# Patient Record
Sex: MALE | Race: BLACK OR AFRICAN AMERICAN | Employment: OTHER | URBAN - METROPOLITAN AREA
[De-identification: names, ages, dates, MRNs, and addresses within clinical notes are randomized per-mention and may not be internally consistent; named-entity substitution may affect disease eponyms.]

---

## 2017-01-09 RX ORDER — LISINOPRIL 40 MG/1
40 TABLET ORAL DAILY
Qty: 90 TAB | Refills: 2 | Status: SHIPPED | OUTPATIENT
Start: 2017-01-09 | End: 2017-10-05 | Stop reason: SDUPTHER

## 2017-01-09 NOTE — TELEPHONE ENCOUNTER
Requested Prescriptions     Pending Prescriptions Disp Refills    lisinopril (PRINIVIL, ZESTRIL) 40 mg tablet 90 Tab 2     Sig: Take 1 Tab by mouth daily.      Last OV-11/30/16  Next OV-6/14/17  Med refilled-4/7/16

## 2017-04-27 RX ORDER — PRAVASTATIN SODIUM 20 MG/1
20 TABLET ORAL
Qty: 90 TAB | Refills: 3 | Status: SHIPPED | OUTPATIENT
Start: 2017-04-27 | End: 2018-03-31 | Stop reason: SDUPTHER

## 2017-04-27 RX ORDER — AMLODIPINE BESYLATE 2.5 MG/1
2.5 TABLET ORAL DAILY
Qty: 90 TAB | Refills: 3 | Status: SHIPPED | OUTPATIENT
Start: 2017-04-27 | End: 2017-06-14 | Stop reason: SDUPTHER

## 2017-04-27 NOTE — TELEPHONE ENCOUNTER
From: Gregoria Adames  To: Elaine Barthel, MD  Sent: 4/27/2017 7:10 AM EDT  Subject: Medication Renewal Request    Original authorizing provider: Elaine Barthel, MD Suella Morones would like a refill of the following medications:  pravastatin (PRAVACHOL) 20 mg tablet Elaine Barthel, MD]  amLODIPine (NORVASC) 2.5 mg tablet Elaine Barthel, MD]    Preferred pharmacy: [facility] [address] [place] [address] 17.:

## 2017-06-14 ENCOUNTER — OFFICE VISIT (OUTPATIENT)
Dept: INTERNAL MEDICINE CLINIC | Age: 62
End: 2017-06-14

## 2017-06-14 VITALS
DIASTOLIC BLOOD PRESSURE: 88 MMHG | HEART RATE: 76 BPM | HEIGHT: 68 IN | WEIGHT: 219.4 LBS | SYSTOLIC BLOOD PRESSURE: 150 MMHG | OXYGEN SATURATION: 97 % | TEMPERATURE: 97.7 F | BODY MASS INDEX: 33.25 KG/M2

## 2017-06-14 DIAGNOSIS — I10 ESSENTIAL HYPERTENSION: ICD-10-CM

## 2017-06-14 DIAGNOSIS — E78.00 PURE HYPERCHOLESTEROLEMIA: Primary | ICD-10-CM

## 2017-06-14 DIAGNOSIS — R73.9 HYPERGLYCEMIA: ICD-10-CM

## 2017-06-14 LAB — HBA1C MFR BLD HPLC: 5.7 % (ref 4.8–5.6)

## 2017-06-14 RX ORDER — AMLODIPINE BESYLATE 5 MG/1
5 TABLET ORAL DAILY
Qty: 90 TAB | Refills: 3 | Status: SHIPPED | OUTPATIENT
Start: 2017-06-14 | End: 2018-06-05 | Stop reason: SDUPTHER

## 2017-06-14 NOTE — MR AVS SNAPSHOT
Visit Information Date & Time Provider Department Dept. Phone Encounter #  
 6/14/2017  8:30 AM Rich Muñoz, [address] [place] [address],[address] [phone] [id number] Follow-up Instructions Return in about 6 months (around 12/14/2017) for htn hld predaiebtes labs psa. Upcoming Health Maintenance Date Due INFLUENZA AGE 9 TO ADULT 8/1/2017 COLONOSCOPY 1/10/2020 DTaP/Tdap/Td series (2 - Td) 11/19/2025 Allergies as of 6/14/2017  Review Complete On: 6/14/2017 By: Gurpreet Cardenas LPN Severity Noted Reaction Type Reactions Livalo [Pitavastatin]  11/24/2014    Myalgia Pravastatin  11/24/2014    Myalgia Patient not allergic Current Immunizations  Reviewed on 10/8/2015 Name Date Influenza Vaccine 8/30/2016, 10/7/2015, 9/17/2014, 10/17/2013 Tdap 11/19/2015 Zoster Vaccine, Live 1/20/2016 Not reviewed this visit You Were Diagnosed With   
  
 Codes Comments Pure hypercholesterolemia    -  Primary ICD-10-CM: E78.00 ICD-9-CM: 272.0 Essential hypertension     ICD-10-CM: I10 
ICD-9-CM: 401.9 Hyperglycemia     ICD-10-CM: R73.9 ICD-9-CM: 790.29 Vitals BP Pulse Temp Height(growth percentile) Weight(growth percentile) SpO2  
 150/88 76 97.7 °F (36.5 °C) (Oral) 5' 8\" (1.727 m) 219 lb 6.4 oz (99.5 kg) 97% BMI Smoking Status 33.36 kg/m2 Never Smoker Vitals History BMI and BSA Data Body Mass Index Body Surface Area  
 33.36 kg/m 2 2.18 m 2 Preferred Pharmacy Pharmacy Name Phone [address] [facility], [address] [phone] Your Updated Medication List  
  
   
This list is accurate as of: 6/14/17  8:50 AM.  Always use your most recent med list. amLODIPine 5 mg tablet Commonly known as:  Cindy Arnt Take 1 Tab by mouth daily. aspirin 81 mg tablet Take  by mouth daily. cyclobenzaprine 5 mg tablet Commonly known as:  FLEXERIL Take 1 Tab by mouth three (3) times daily as needed for Muscle Spasm(s). lisinopril 40 mg tablet Commonly known as:  Minus Salk Take 1 Tab by mouth daily. pravastatin 20 mg tablet Commonly known as:  PRAVACHOL Take 1 Tab by mouth nightly. Prescriptions Sent to Pharmacy Refills  
 amLODIPine (NORVASC) 5 mg tablet 3 Sig: Take 1 Tab by mouth daily. Class: Normal  
 Pharmacy: [facility] [address] #: [phone] Route: Oral  
  
We Performed the Following AMB POC HEMOGLOBIN A1C [86815 CPT(R)] Follow-up Instructions Return in about 6 months (around 12/14/2017) for htn hld predaiebtes labs psa. Introducing [facility]! Dear Lee Gutierrez: Thank you for requesting a [employer] account. Our records indicate that you already have an active [employer] account. You can access your account anytime at [email or web address] Did you know that you can access your hospital and ER discharge instructions at any time in [employer]? You can also review all of your test results from your hospital stay or ER visit. Additional Information If you have questions, please visit the Frequently Asked Questions section of the [employer] website at [email or web address] Remember, [employer] is NOT to be used for urgent needs. For medical emergencies, dial 911. Now available from your iPhone and Android! Please provide this summary of care documentation to your next provider. Your primary care clinician is listed as Shantel ROPER. If you have any questions after today's visit, please call [phone].

## 2017-06-14 NOTE — PROGRESS NOTES
HISTORY OF PRESENT ILLNESS  Shane Copeland is a 64 y.o. male. HPI     F/u HTN HLD prediabetes  No cp or sob sxs  Walks, yardwork, etc no problems  Home bp readinngs avg 130's/80-s  Took hctz in the past but caused dehydration and low sodium    Patient Active Problem List    Diagnosis Date Noted    Hyperglycemia 07/10/2012    HLD (hyperlipidemia) 07/10/2012    HTN (hypertension) 02/22/2010     Current Outpatient Prescriptions   Medication Sig Dispense Refill    amLODIPine (NORVASC) 5 mg tablet Take 1 Tab by mouth daily. 90 Tab 3    pravastatin (PRAVACHOL) 20 mg tablet Take 1 Tab by mouth nightly. 90 Tab 3    lisinopril (PRINIVIL, ZESTRIL) 40 mg tablet Take 1 Tab by mouth daily. 90 Tab 2    aspirin 81 mg Tab Take  by mouth daily.  cyclobenzaprine (FLEXERIL) 5 mg tablet Take 1 Tab by mouth three (3) times daily as needed for Muscle Spasm(s). 30 Tab 0     Allergies   Allergen Reactions    Livalo [Pitavastatin] Myalgia    Pravastatin Myalgia     Patient not allergic      Lab Results  Component Value Date/Time   Hemoglobin A1c 6.2 11/30/2016 08:54 AM   Hemoglobin A1c 6.0 03/25/2015 08:23 AM   Hemoglobin A1c 6.1 11/20/2014 08:53 AM   Glucose 98 11/30/2016 08:54 AM   LDL, calculated 107 11/30/2016 08:54 AM   Creatinine 1.29 11/30/2016 08:54 AM      Lab Results  Component Value Date/Time   Cholesterol, total 183 11/30/2016 08:54 AM   HDL Cholesterol 56 11/30/2016 08:54 AM   LDL, calculated 107 11/30/2016 08:54 AM   Triglyceride 102 11/30/2016 08:54 AM     Lab Results  Component Value Date/Time   GFR est non-AA 59 11/30/2016 08:54 AM   GFR est AA 69 11/30/2016 08:54 AM   Creatinine 1.29 11/30/2016 08:54 AM   BUN 18 11/30/2016 08:54 AM   Sodium 140 11/30/2016 08:54 AM   Potassium 4.4 11/30/2016 08:54 AM   Chloride 101 11/30/2016 08:54 AM   CO2 23 11/30/2016 08:54 AM        ROS    Physical Exam   Constitutional: He appears well-developed and well-nourished. No distress.    Appears stated age   HENT:   Head: Normocephalic. Cardiovascular: Normal rate, regular rhythm and normal heart sounds. Pulmonary/Chest: Effort normal and breath sounds normal.   Abdominal: Soft. Musculoskeletal: He exhibits no edema. Neurological: He is alert. Psychiatric: He has a normal mood and affect. Nursing note and vitals reviewed. ASSESSMENT and PLAN  Teressa Ríos was seen today for blood sugar problem and hypertension. Diagnoses and all orders for this visit:    Pure hypercholesterolemia   Continue statin tx  Essential hypertension   Elevated-increase norvasc 5 mg every day   bp monitoring  Hyperglycemia  -     AMB POC HEMOGLOBIN A1C 5.7 improved, continue diet and exericise    Other orders  -     amLODIPine (NORVASC) 5 mg tablet; Take 1 Tab by mouth daily. Follow-up Disposition:  Return in about 6 months (around 12/14/2017) for htn hld predaiebtes labs psa.

## 2017-10-05 RX ORDER — LISINOPRIL 40 MG/1
40 TABLET ORAL DAILY
Qty: 90 TAB | Refills: 3 | Status: SHIPPED | OUTPATIENT
Start: 2017-10-05 | End: 2017-12-14 | Stop reason: SDUPTHER

## 2017-10-05 NOTE — TELEPHONE ENCOUNTER
From: Anthony Dorantes  To: Catina Duran MD  Sent: 10/5/2017 7:47 AM EDT  Subject: Medication Renewal Request    Original authorizing provider: MD Anthony Mckinnon would like a refill of the following medications:  lisinopril (PRINIVIL, ZESTRIL) 40 mg tablet Catina Duran MD]    Preferred pharmacy: [address] 17.:

## 2017-12-14 ENCOUNTER — OFFICE VISIT (OUTPATIENT)
Dept: INTERNAL MEDICINE CLINIC | Age: 62
End: 2017-12-14

## 2017-12-14 VITALS
SYSTOLIC BLOOD PRESSURE: 146 MMHG | HEART RATE: 85 BPM | BODY MASS INDEX: 33.19 KG/M2 | WEIGHT: 219 LBS | DIASTOLIC BLOOD PRESSURE: 84 MMHG | TEMPERATURE: 98.2 F | OXYGEN SATURATION: 97 % | HEIGHT: 68 IN

## 2017-12-14 DIAGNOSIS — I10 ESSENTIAL HYPERTENSION: Primary | ICD-10-CM

## 2017-12-14 DIAGNOSIS — E78.00 PURE HYPERCHOLESTEROLEMIA: ICD-10-CM

## 2017-12-14 DIAGNOSIS — R73.9 HYPERGLYCEMIA: ICD-10-CM

## 2017-12-14 DIAGNOSIS — Z12.5 PROSTATE CANCER SCREENING: ICD-10-CM

## 2017-12-14 NOTE — PROGRESS NOTES
HISTORY OF PRESENT ILLNESS  Maia Mcwilliams is a 58 y.o. male. HPI   F/u HTN HLD prediabetes  sbp was up last visit -norvasc was increased to 5 mg qd  No cp or sob sxs  Walks, yardwork, etc no problems  Home bp readinngs avg 130's/80-s  Took hctz in the past but caused dehydration and low sodium    Patient Active Problem List    Diagnosis Date Noted    Hyperglycemia 07/10/2012    HLD (hyperlipidemia) 07/10/2012    HTN (hypertension) 02/22/2010     Current Outpatient Prescriptions   Medication Sig Dispense Refill    lisinopril (PRINIVIL, ZESTRIL) 40 mg tablet TAKE 1 TABLET BY MOUTH DAILY 90 Tab 3    lisinopril (PRINIVIL, ZESTRIL) 40 mg tablet Take 1 Tab by mouth daily. 90 Tab 3    amLODIPine (NORVASC) 5 mg tablet Take 1 Tab by mouth daily. 90 Tab 3    pravastatin (PRAVACHOL) 20 mg tablet Take 1 Tab by mouth nightly. 90 Tab 3    cyclobenzaprine (FLEXERIL) 5 mg tablet Take 1 Tab by mouth three (3) times daily as needed for Muscle Spasm(s). 30 Tab 0    aspirin 81 mg Tab Take  by mouth daily.        Allergies   Allergen Reactions    Livalo [Pitavastatin] Myalgia    Pravastatin Myalgia     Patient not allergic     Social History   Substance Use Topics    Smoking status: Never Smoker    Smokeless tobacco: Never Used    Alcohol use Yes      Comment: occasional      Lab Results  Component Value Date/Time   Hemoglobin A1c 6.2 11/30/2016 08:54 AM   Hemoglobin A1c 6.0 03/25/2015 08:23 AM   Hemoglobin A1c 6.1 11/20/2014 08:53 AM   Glucose 98 11/30/2016 08:54 AM   LDL, calculated 107 11/30/2016 08:54 AM   Creatinine 1.29 11/30/2016 08:54 AM      Lab Results  Component Value Date/Time   Cholesterol, total 183 11/30/2016 08:54 AM   HDL Cholesterol 56 11/30/2016 08:54 AM   LDL, calculated 107 11/30/2016 08:54 AM   Triglyceride 102 11/30/2016 08:54 AM     Lab Results  Component Value Date/Time   GFR est non-AA 59 11/30/2016 08:54 AM   GFR est AA 69 11/30/2016 08:54 AM   Creatinine 1.29 11/30/2016 08:54 AM   BUN 18 11/30/2016 08:54 AM   Sodium 140 11/30/2016 08:54 AM   Potassium 4.4 11/30/2016 08:54 AM   Chloride 101 11/30/2016 08:54 AM   CO2 23 11/30/2016 08:54 AM          ROS    Physical Exam   Constitutional: He appears well-developed and well-nourished. No distress. Appears stated age   HENT:   Head: Normocephalic. Cardiovascular: Normal rate, regular rhythm and normal heart sounds. Exam reveals no gallop and no friction rub. No murmur heard. Pulmonary/Chest: Effort normal and breath sounds normal. No respiratory distress. He has no wheezes. He has no rales. He exhibits no tenderness. Abdominal: Soft. Musculoskeletal: He exhibits no edema. Neurological: He is alert. Psychiatric: He has a normal mood and affect. Nursing note and vitals reviewed. ASSESSMENT and PLAN  Diagnoses and all orders for this visit:    1. Essential hypertension  -     CBC W/O DIFF  -     METABOLIC PANEL, COMPREHENSIVE   Reasonable control   DASH diet handout  2. Pure hypercholesterolemia  -     METABOLIC PANEL, COMPREHENSIVE  -     LIPID PANEL   Continue statin  3. Hyperglycemia  -     HEMOGLOBIN A1C WITH EAG   Low calorie diet recommended  4.  Prostate cancer screening  -     PSA SCREENING (SCREENING) ()    rtc 6 months  Follow-up Disposition: Not on File

## 2017-12-14 NOTE — MR AVS SNAPSHOT
Visit Information Date & Time Provider Department Dept. Phone Encounter #  
 12/14/2017  8:30 AM Kyle Berg, [address] [place] [address],[address] [phone] [id number] Follow-up Instructions Return in about 6 months (around 6/14/2018) for htn hld prediabetes. Upcoming Health Maintenance Date Due COLONOSCOPY 1/10/2020 DTaP/Tdap/Td series (2 - Td) 11/19/2025 Allergies as of 12/14/2017  Review Complete On: 12/14/2017 By: Rossy Mandujano LPN Severity Noted Reaction Type Reactions Livalo [Pitavastatin]  11/24/2014    Myalgia Pravastatin  11/24/2014    Myalgia Patient not allergic Current Immunizations  Reviewed on 10/8/2015 Name Date Influenza Vaccine 10/6/2017, 8/30/2016, 10/7/2015, 9/17/2014, 10/17/2013 Tdap 11/19/2015 Zoster Vaccine, Live 1/20/2016 Not reviewed this visit You Were Diagnosed With   
  
 Codes Comments Essential hypertension    -  Primary ICD-10-CM: I10 
ICD-9-CM: 401.9 Pure hypercholesterolemia     ICD-10-CM: E78.00 ICD-9-CM: 272.0 Hyperglycemia     ICD-10-CM: R73.9 ICD-9-CM: 790.29 Prostate cancer screening     ICD-10-CM: Z12.5 ICD-9-CM: V76.44 Vitals BP Pulse Temp Height(growth percentile) Weight(growth percentile) SpO2  
 146/84 85 98.2 °F (36.8 °C) (Oral) 5' 8\" (1.727 m) 219 lb (99.3 kg) 97% BMI Smoking Status 33.3 kg/m2 Never Smoker Vitals History BMI and BSA Data Body Mass Index Body Surface Area  
 33.3 kg/m 2 2.18 m 2 Preferred Pharmacy Pharmacy Name Phone [facility], [address] [place] [phone] Your Updated Medication List  
  
   
This list is accurate as of: 12/14/17  8:41 AM.  Always use your most recent med list. amLODIPine 5 mg tablet Commonly known as:  Lennis Eagles Take 1 Tab by mouth daily. aspirin 81 mg tablet Take  by mouth daily. cyclobenzaprine 5 mg tablet Commonly known as:  FLEXERIL Take 1 Tab by mouth three (3) times daily as needed for Muscle Spasm(s). lisinopril 40 mg tablet Commonly known as:  PRINIVIL, ZESTRIL  
TAKE 1 TABLET BY MOUTH DAILY pravastatin 20 mg tablet Commonly known as:  PRAVACHOL Take 1 Tab by mouth nightly. We Performed the Following CBC W/O DIFF [62092 CPT(R)] HEMOGLOBIN A1C WITH EAG [91547 CPT(R)] LIPID PANEL [12476 CPT(R)] METABOLIC PANEL, COMPREHENSIVE [57733 CPT(R)] PSA SCREENING (SCREENING) [ [facility]] Follow-up Instructions Return in about 6 months (around 6/14/2018) for htn hld prediabetes. Introducing [facility]! Dear Ian Him: Thank you for requesting a [employer] account. Our records indicate that you already have an active [employer] account. You can access your account anytime at [email or web address] Did you know that you can access your hospital and ER discharge instructions at any time in EL[employer]? You can also review all of your test results from your hospital stay or ER visit. Additional Information If you have questions, please visit the Frequently Asked Questions section of the [employer] website at [email or web address] Remember, [employer] is NOT to be used for urgent needs. For medical emergencies, dial 911. Now available from your iPhone and Android! Please provide this summary of care documentation to your next provider. Your primary care clinician is listed as Giuseppe ROPER. If you have any questions after today's visit, please call [phone].

## 2017-12-15 LAB
ALBUMIN SERPL-MCNC: 4.3 G/DL (ref 3.6–4.8)
ALBUMIN/GLOB SERPL: 1.5 {RATIO} (ref 1.2–2.2)
ALP SERPL-CCNC: 85 IU/L (ref 39–117)
ALT SERPL-CCNC: 20 IU/L (ref 0–44)
AST SERPL-CCNC: 16 IU/L (ref 0–40)
BILIRUB SERPL-MCNC: 0.3 MG/DL (ref 0–1.2)
BUN SERPL-MCNC: 15 MG/DL (ref 8–27)
BUN/CREAT SERPL: 11 (ref 10–24)
CALCIUM SERPL-MCNC: 9.4 MG/DL (ref 8.6–10.2)
CHLORIDE SERPL-SCNC: 101 MMOL/L (ref 96–106)
CHOLEST SERPL-MCNC: 186 MG/DL (ref 100–199)
CO2 SERPL-SCNC: 25 MMOL/L (ref 18–29)
CREAT SERPL-MCNC: 1.35 MG/DL (ref 0.76–1.27)
ERYTHROCYTE [DISTWIDTH] IN BLOOD BY AUTOMATED COUNT: 17 % (ref 12.3–15.4)
EST. AVERAGE GLUCOSE BLD GHB EST-MCNC: 123 MG/DL
GFR SERPLBLD CREATININE-BSD FMLA CKD-EPI: 56 ML/MIN/1.73
GFR SERPLBLD CREATININE-BSD FMLA CKD-EPI: 65 ML/MIN/1.73
GLOBULIN SER CALC-MCNC: 2.9 G/DL (ref 1.5–4.5)
GLUCOSE SERPL-MCNC: 100 MG/DL (ref 65–99)
HBA1C MFR BLD: 5.9 % (ref 4.8–5.6)
HCT VFR BLD AUTO: 42.8 % (ref 37.5–51)
HDLC SERPL-MCNC: 57 MG/DL
HGB BLD-MCNC: 13.9 G/DL (ref 13–17.7)
LDLC SERPL CALC-MCNC: 105 MG/DL (ref 0–99)
MCH RBC QN AUTO: 27.2 PG (ref 26.6–33)
MCHC RBC AUTO-ENTMCNC: 32.5 G/DL (ref 31.5–35.7)
MCV RBC AUTO: 84 FL (ref 79–97)
PLATELET # BLD AUTO: 202 X10E3/UL (ref 150–379)
POTASSIUM SERPL-SCNC: 4.4 MMOL/L (ref 3.5–5.2)
PROT SERPL-MCNC: 7.2 G/DL (ref 6–8.5)
PSA SERPL-MCNC: 1.6 NG/ML (ref 0–4)
RBC # BLD AUTO: 5.11 X10E6/UL (ref 4.14–5.8)
SODIUM SERPL-SCNC: 142 MMOL/L (ref 134–144)
TRIGL SERPL-MCNC: 121 MG/DL (ref 0–149)
VLDLC SERPL CALC-MCNC: 24 MG/DL (ref 5–40)
WBC # BLD AUTO: 8.8 X10E3/UL (ref 3.4–10.8)

## 2018-04-02 RX ORDER — PRAVASTATIN SODIUM 20 MG/1
20 TABLET ORAL
Qty: 90 TAB | Refills: 3 | Status: SHIPPED | OUTPATIENT
Start: 2018-04-02 | End: 2019-04-07 | Stop reason: SDUPTHER

## 2018-04-02 NOTE — TELEPHONE ENCOUNTER
From: Gali Escalera  To: Hay Alvarez MD  Sent: 3/31/2018 7:25 PM EDT  Subject: Medication Renewal Request    Original authorizing provider: MD Gali Mueller would like a refill of the following medications:  pravastatin (PRAVACHOL) 20 mg tablet Hay Alvarez MD]    Preferred pharmacy: [address] 17.:

## 2018-06-05 RX ORDER — AMLODIPINE BESYLATE 5 MG/1
TABLET ORAL
Qty: 90 TAB | Refills: 0 | Status: SHIPPED | OUTPATIENT
Start: 2018-06-05 | End: 2018-06-14 | Stop reason: SDUPTHER

## 2018-06-14 ENCOUNTER — OFFICE VISIT (OUTPATIENT)
Dept: INTERNAL MEDICINE CLINIC | Age: 63
End: 2018-06-14

## 2018-06-14 VITALS
SYSTOLIC BLOOD PRESSURE: 152 MMHG | OXYGEN SATURATION: 98 % | TEMPERATURE: 97.7 F | DIASTOLIC BLOOD PRESSURE: 82 MMHG | BODY MASS INDEX: 33.65 KG/M2 | HEART RATE: 77 BPM | WEIGHT: 222 LBS | HEIGHT: 68 IN

## 2018-06-14 DIAGNOSIS — E78.00 PURE HYPERCHOLESTEROLEMIA: ICD-10-CM

## 2018-06-14 DIAGNOSIS — R73.9 HYPERGLYCEMIA: ICD-10-CM

## 2018-06-14 DIAGNOSIS — I10 ESSENTIAL HYPERTENSION: Primary | ICD-10-CM

## 2018-06-14 DIAGNOSIS — E66.9 OBESITY (BMI 30-39.9): ICD-10-CM

## 2018-06-14 LAB — HBA1C MFR BLD HPLC: 5.6 % (ref 4.8–5.6)

## 2018-06-14 RX ORDER — AMLODIPINE BESYLATE 10 MG/1
TABLET ORAL
Qty: 90 TAB | Refills: 3 | Status: SHIPPED | OUTPATIENT
Start: 2018-06-14 | End: 2019-07-11 | Stop reason: SDUPTHER

## 2018-06-14 NOTE — ACP (ADVANCE CARE PLANNING)
Chief Complaint   Patient presents with    Cholesterol Problem     6 month follow up    Hypertension     6 month follow up    Blood sugar problem     6 month follow up    Labs     6 month follow up Fasting

## 2018-06-14 NOTE — MR AVS SNAPSHOT
[facility] [address] [place] 
[phone] Patient: Rosey Cramer MRN: [id number] SJR:[id number] Visit Information Date & Time Provider Department Dept. Phone Encounter #  
 6/14/2018  8:30 AM Maribel Pascual, [address] [place] [address],[address] [phone] [id number] Follow-up Instructions Return in about 6 months (around 12/14/2018) for cpe. Upcoming Health Maintenance Date Due Influenza Age 5 to Adult 8/1/2018 COLONOSCOPY 1/10/2020 DTaP/Tdap/Td series (2 - Td) 11/19/2025 Allergies as of 6/14/2018  Review Complete On: 6/14/2018 By: Erin Barry LPN Severity Noted Reaction Type Reactions Livalo [Pitavastatin]  11/24/2014    Myalgia Pravastatin  11/24/2014    Myalgia Patient not allergic Current Immunizations  Reviewed on 10/8/2015 Name Date Influenza Vaccine 10/6/2017, 8/30/2016, 10/7/2015, 9/17/2014, 10/17/2013 Tdap 11/19/2015 Zoster Vaccine, Live 1/20/2016 Not reviewed this visit You Were Diagnosed With   
  
 Codes Comments Essential hypertension    -  Primary ICD-10-CM: I10 
ICD-9-CM: 401.9 Pure hypercholesterolemia     ICD-10-CM: E78.00 ICD-9-CM: 272.0 Hyperglycemia     ICD-10-CM: R73.9 ICD-9-CM: 790.29 Vitals BP Pulse Temp Height(growth percentile) Weight(growth percentile) SpO2  
 157/87 (BP 1 Location: Left arm, BP Patient Position: Sitting) 77 97.7 °F (36.5 °C) (Oral) 5' 8\" (1.727 m) 222 lb (100.7 kg) 98% BMI Smoking Status 33.75 kg/m2 Never Smoker BMI and BSA Data Body Mass Index Body Surface Area 33.75 kg/m 2 2.2 m 2 Preferred Pharmacy Pharmacy Name Phone [address], [address] [phone] Your Updated Medication List  
  
   
 This list is accurate as of 6/14/18  8:45 AM.  Always use your most recent med list. amLODIPine 10 mg tablet Commonly known as:  [place] Shield TAKE 1 TABLET BY MOUTH ONCE DAILY  
  
 aspirin 81 mg tablet Take  by mouth daily. cyclobenzaprine 5 mg tablet Commonly known as:  FLEXERIL Take 1 Tab by mouth three (3) times daily as needed for Muscle Spasm(s). lisinopril 40 mg tablet Commonly known as:  PRINIVIL, ZESTRIL  
TAKE 1 TABLET BY MOUTH DAILY pravastatin 20 mg tablet Commonly known as:  PRAVACHOL Take 1 Tab by mouth nightly. Prescriptions Sent to Pharmacy Refills  
 amLODIPine (NORVASC) 10 mg tablet 3 Sig: TAKE 1 TABLET BY MOUTH ONCE DAILY Class: Normal  
 Pharmacy: [facility] [address] #: [phone] We Performed the Following AMB POC HEMOGLOBIN A1C [41932 CPT(R)] Follow-up Instructions Return in about 6 months (around 12/14/2018) for cpe. Introducing [facility]! Dear Dayanna Bahena: Thank you for requesting a [employer] account. Our records indicate that you already have an active [employer] account. You can access your account anytime at [email or web address] Did you know that you can access your hospital and ER discharge instructions at any time in Sent[employer]? You can also review all of your test results from your hospital stay or ER visit. Additional Information If you have questions, please visit the Frequently Asked Questions section of the [employer] website at [email or web address] Remember, [employer] is NOT to be used for urgent needs. For medical emergencies, dial 911. Now available from your iPhone and Android! Please provide this summary of care documentation to your next provider. Your primary care clinician is listed as Diana ROPER.  If you have any questions after today's visit, please call [phone].

## 2018-06-14 NOTE — PROGRESS NOTES
HISTORY OF PRESENT ILLNESS  Unknown Quivers is a 58 y.o. male. HPI     F/u HTN HLD prediabetes  avg bp at home 129/83 with digital cuff  Walks, yardwork for exercise  Weight up a few lbs since last visit but pt reports low calorie diet      Last OV  sbp was up last visit -norvasc was increased to 5 mg qd  No cp or sob sxs  Walks, yardwork, etc no problems  Home bp readinngs avg 130's/80-s  Took hctz in the past but caused dehydration and low sodium      Patient Active Problem List    Diagnosis Date Noted    Hyperglycemia 07/10/2012    HLD (hyperlipidemia) 07/10/2012    HTN (hypertension) 02/22/2010     Current Outpatient Prescriptions   Medication Sig Dispense Refill    amLODIPine (NORVASC) 5 mg tablet TAKE 1 TABLET BY MOUTH ONCE DAILY 90 Tab 0    pravastatin (PRAVACHOL) 20 mg tablet Take 1 Tab by mouth nightly. 90 Tab 3    lisinopril (PRINIVIL, ZESTRIL) 40 mg tablet TAKE 1 TABLET BY MOUTH DAILY 90 Tab 3    cyclobenzaprine (FLEXERIL) 5 mg tablet Take 1 Tab by mouth three (3) times daily as needed for Muscle Spasm(s). 30 Tab 0    aspirin 81 mg Tab Take  by mouth daily.        Allergies   Allergen Reactions    Livalo [Pitavastatin] Myalgia    Pravastatin Myalgia     Patient not allergic     Social History   Substance Use Topics    Smoking status: Never Smoker    Smokeless tobacco: Never Used    Alcohol use Yes      Comment: occasional      Lab Results  Component Value Date/Time   WBC 8.8 12/14/2017 08:51 AM   HGB 13.9 12/14/2017 08:51 AM   HCT 42.8 12/14/2017 08:51 AM   PLATELET 121 31/27/8809 08:51 AM   MCV 84 12/14/2017 08:51 AM     Lab Results  Component Value Date/Time   Hemoglobin A1c 5.9 (H) 12/14/2017 08:51 AM   Hemoglobin A1c 6.2 (H) 11/30/2016 08:54 AM   Hemoglobin A1c 6.0 (H) 03/25/2015 08:23 AM   Glucose 100 (H) 12/14/2017 08:51 AM   LDL, calculated 105 (H) 12/14/2017 08:51 AM   Creatinine 1.35 (H) 12/14/2017 08:51 AM      Lab Results  Component Value Date/Time   GFR est non-AA 56 (L) 12/14/2017 08:51 AM   GFR est AA 65 12/14/2017 08:51 AM   Creatinine 1.35 (H) 12/14/2017 08:51 AM   BUN 15 12/14/2017 08:51 AM   Sodium 142 12/14/2017 08:51 AM   Potassium 4.4 12/14/2017 08:51 AM   Chloride 101 12/14/2017 08:51 AM   CO2 25 12/14/2017 08:51 AM        ROS    Physical Exam   Constitutional: He appears well-developed and well-nourished. No distress. Appears stated age   HENT:   Head: Normocephalic. Cardiovascular: Normal rate, regular rhythm and normal heart sounds. Exam reveals no gallop and no friction rub. No murmur heard. Pulmonary/Chest: Effort normal and breath sounds normal.   Abdominal: Soft. Musculoskeletal: He exhibits no edema. Neurological: He is alert. Psychiatric: He has a normal mood and affect. Nursing note and vitals reviewed. ASSESSMENT and PLAN  Diagnoses and all orders for this visit:    1. Essential hypertension   Mild elevation   Increase norvasc 10 mg every day   Low sodium diet and exericse   Continue home bp monitoring--lower home readings  2. Pure hypercholesterolemia  -     AMB POC HEMOGLOBIN A1C   Continue statin  3. Hyperglycemia  -     AMB POC HEMOGLOBIN A1C 5.6   Continue diet and exercise    4. Obesity (BMI 30-39. 9)   I have reviewed/discussed the above normal BMI with the patient. I have recommended the following interventions: dietary management education, guidance, and counseling and encourage exercise . Chiki Carreon Other orders  -     amLODIPine (NORVASC) 10 mg tablet; TAKE 1 TABLET BY MOUTH ONCE DAILY      Follow-up Disposition:  Return in about 6 months (around 12/14/2018) for cpe.

## 2018-10-14 RX ORDER — LISINOPRIL 40 MG/1
TABLET ORAL
Qty: 90 TAB | Refills: 0 | Status: SHIPPED | OUTPATIENT
Start: 2018-10-14 | End: 2018-12-13 | Stop reason: SDUPTHER

## 2018-12-12 PROBLEM — E66.9 OBESITY (BMI 30.0-34.9): Status: ACTIVE | Noted: 2018-12-12

## 2018-12-12 NOTE — PROGRESS NOTES
HISTORY OF PRESENT ILLNESS  Ton Guerin is a 61 y.o. male. HPI       F/u HTN HLD prediabetes obesity  Stays active , yardwork  No cp sob or bph sxs  Due for PSA and labs  bp readings wnl at home  Last OV  avg bp at home 129/83 with digital cuff  Walks, yardwork for exercise  Weight up a few lbs since last visit but pt reports low calorie diet        Last OV  sbp was up last visit -norvasc was increased to 5 mg qd  No cp or sob sxs  Walks, yardwork, etc no problems  Home bp readinngs avg 130's/80-s  Took hctz in the past but caused dehydration and low sodium              Patient Active Problem List    Diagnosis Date Noted    Hyperglycemia 07/10/2012    HLD (hyperlipidemia) 07/10/2012    HTN (hypertension) 02/22/2010     Current Outpatient Medications   Medication Sig Dispense Refill    lisinopril (PRINIVIL, ZESTRIL) 40 mg tablet TAKE 1 TABLET BY MOUTH ONCE DAILY 90 Tab 0    amLODIPine (NORVASC) 10 mg tablet TAKE 1 TABLET BY MOUTH ONCE DAILY 90 Tab 3    pravastatin (PRAVACHOL) 20 mg tablet Take 1 Tab by mouth nightly. 90 Tab 3    lisinopril (PRINIVIL, ZESTRIL) 40 mg tablet TAKE 1 TABLET BY MOUTH DAILY 90 Tab 3    cyclobenzaprine (FLEXERIL) 5 mg tablet Take 1 Tab by mouth three (3) times daily as needed for Muscle Spasm(s). 30 Tab 0    aspirin 81 mg Tab Take  by mouth daily.        Allergies   Allergen Reactions    Livalo [Pitavastatin] Myalgia    Pravastatin Myalgia     Patient not allergic      Lab Results   Component Value Date/Time    WBC 8.8 12/14/2017 08:51 AM    HGB 13.9 12/14/2017 08:51 AM    HCT 42.8 12/14/2017 08:51 AM    PLATELET 388 64/49/3540 08:51 AM    MCV 84 12/14/2017 08:51 AM     Lab Results   Component Value Date/Time    Hemoglobin A1c 5.9 (H) 12/14/2017 08:51 AM    Hemoglobin A1c 6.2 (H) 11/30/2016 08:54 AM    Hemoglobin A1c 6.0 (H) 03/25/2015 08:23 AM    Glucose 100 (H) 12/14/2017 08:51 AM    LDL, calculated 105 (H) 12/14/2017 08:51 AM    Creatinine 1.35 (H) 12/14/2017 08:51 AM Lab Results   Component Value Date/Time    Cholesterol, total 186 12/14/2017 08:51 AM    HDL Cholesterol 57 12/14/2017 08:51 AM    LDL, calculated 105 (H) 12/14/2017 08:51 AM    Triglyceride 121 12/14/2017 08:51 AM     Lab Results   Component Value Date/Time    GFR est non-AA 56 (L) 12/14/2017 08:51 AM    GFR est AA 65 12/14/2017 08:51 AM    Creatinine 1.35 (H) 12/14/2017 08:51 AM    BUN 15 12/14/2017 08:51 AM    Sodium 142 12/14/2017 08:51 AM    Potassium 4.4 12/14/2017 08:51 AM    Chloride 101 12/14/2017 08:51 AM    CO2 25 12/14/2017 08:51 AM        ROS    Physical Exam   Constitutional: He appears well-developed and well-nourished. No distress. Appears stated age   HENT:   Head: Normocephalic. Cardiovascular: Normal rate, regular rhythm and normal heart sounds. Exam reveals no gallop and no friction rub. No murmur heard. Pulmonary/Chest: Effort normal and breath sounds normal.   Abdominal: Soft. Musculoskeletal: He exhibits no edema. Neurological: He is alert. Psychiatric: He has a normal mood and affect. Nursing note and vitals reviewed. ASSESSMENT and PLAN  Diagnoses and all orders for this visit:    1. Essential hypertension  -     CBC W/O DIFF  -     METABOLIC PANEL, COMPREHENSIVE   controlled  2. Pure hypercholesterolemia  -     CBC W/O DIFF  -     METABOLIC PANEL, COMPREHENSIVE  -     LIPID PANEL   Continue statin  3. Prediabetes  -     HEMOGLOBIN A1C WITH EAG   Advised weight reduction  4. Prostate cancer screening  -     PSA W/ REFLX FREE PSA    5. Obesity   I have reviewed/discussed the above normal BMI with the patient. I have recommended the following interventions: dietary management education, guidance, and counseling and encourage exercise . Larry Vo Follow-up Disposition:  Return in about 6 months (around 6/13/2019) for htn hld glucose weight.

## 2018-12-13 ENCOUNTER — OFFICE VISIT (OUTPATIENT)
Dept: INTERNAL MEDICINE CLINIC | Age: 63
End: 2018-12-13

## 2018-12-13 VITALS
TEMPERATURE: 97.5 F | DIASTOLIC BLOOD PRESSURE: 68 MMHG | BODY MASS INDEX: 33.65 KG/M2 | OXYGEN SATURATION: 97 % | HEART RATE: 89 BPM | WEIGHT: 222 LBS | SYSTOLIC BLOOD PRESSURE: 137 MMHG | HEIGHT: 68 IN

## 2018-12-13 DIAGNOSIS — I10 ESSENTIAL HYPERTENSION: Primary | ICD-10-CM

## 2018-12-13 DIAGNOSIS — E78.00 PURE HYPERCHOLESTEROLEMIA: ICD-10-CM

## 2018-12-13 DIAGNOSIS — R73.03 PREDIABETES: ICD-10-CM

## 2018-12-13 DIAGNOSIS — Z12.5 PROSTATE CANCER SCREENING: ICD-10-CM

## 2018-12-13 NOTE — PROGRESS NOTES
Chief Complaint   Patient presents with    Hypertension     6 month follow up    Blood sugar problem     6 month follow up    Cholesterol Problem     6 month follow up    Labs     Fasting

## 2018-12-14 LAB
ALBUMIN SERPL-MCNC: 4.2 G/DL (ref 3.6–4.8)
ALBUMIN/GLOB SERPL: 1.6 {RATIO} (ref 1.2–2.2)
ALP SERPL-CCNC: 82 IU/L (ref 39–117)
ALT SERPL-CCNC: 20 IU/L (ref 0–44)
AST SERPL-CCNC: 22 IU/L (ref 0–40)
BILIRUB SERPL-MCNC: 0.3 MG/DL (ref 0–1.2)
BUN SERPL-MCNC: 14 MG/DL (ref 8–27)
BUN/CREAT SERPL: 12 (ref 10–24)
CALCIUM SERPL-MCNC: 9.4 MG/DL (ref 8.6–10.2)
CHLORIDE SERPL-SCNC: 103 MMOL/L (ref 96–106)
CHOLEST SERPL-MCNC: 177 MG/DL (ref 100–199)
CO2 SERPL-SCNC: 25 MMOL/L (ref 20–29)
CREAT SERPL-MCNC: 1.2 MG/DL (ref 0.76–1.27)
ERYTHROCYTE [DISTWIDTH] IN BLOOD BY AUTOMATED COUNT: 17.2 % (ref 12.3–15.4)
EST. AVERAGE GLUCOSE BLD GHB EST-MCNC: 123 MG/DL
GLOBULIN SER CALC-MCNC: 2.6 G/DL (ref 1.5–4.5)
GLUCOSE SERPL-MCNC: 95 MG/DL (ref 65–99)
HBA1C MFR BLD: 5.9 % (ref 4.8–5.6)
HCT VFR BLD AUTO: 41 % (ref 37.5–51)
HDLC SERPL-MCNC: 48 MG/DL
HGB BLD-MCNC: 13.4 G/DL (ref 13–17.7)
LDLC SERPL CALC-MCNC: 102 MG/DL (ref 0–99)
MCH RBC QN AUTO: 27.1 PG (ref 26.6–33)
MCHC RBC AUTO-ENTMCNC: 32.7 G/DL (ref 31.5–35.7)
MCV RBC AUTO: 83 FL (ref 79–97)
PLATELET # BLD AUTO: 198 X10E3/UL (ref 150–379)
POTASSIUM SERPL-SCNC: 4.7 MMOL/L (ref 3.5–5.2)
PROT SERPL-MCNC: 6.8 G/DL (ref 6–8.5)
PSA SERPL-MCNC: 1.6 NG/ML (ref 0–4)
RBC # BLD AUTO: 4.95 X10E6/UL (ref 4.14–5.8)
REFLEX CRITERIA: NORMAL
SODIUM SERPL-SCNC: 142 MMOL/L (ref 134–144)
TRIGL SERPL-MCNC: 133 MG/DL (ref 0–149)
VLDLC SERPL CALC-MCNC: 27 MG/DL (ref 5–40)
WBC # BLD AUTO: 7.9 X10E3/UL (ref 3.4–10.8)

## 2019-04-07 RX ORDER — PRAVASTATIN SODIUM 20 MG/1
TABLET ORAL
Qty: 90 TAB | Refills: 0 | Status: SHIPPED | OUTPATIENT
Start: 2019-04-07 | End: 2019-07-05 | Stop reason: SDUPTHER

## 2019-07-05 RX ORDER — PRAVASTATIN SODIUM 20 MG/1
20 TABLET ORAL DAILY
Qty: 90 TAB | Refills: 3 | Status: SHIPPED | OUTPATIENT
Start: 2019-07-05 | End: 2020-06-27

## 2019-07-05 NOTE — TELEPHONE ENCOUNTER
PCP: Shilo Espinoza MD    Last appt: 12/13/2018  Future Appointments   Date Time Provider Idris Draper   7/16/2019  8:45 AM Shilo Espinoza MD [facility] 87       Requested Prescriptions     Pending Prescriptions Disp Refills    pravastatin (PRAVACHOL) 20 mg tablet 90 Tab 0

## 2019-07-14 NOTE — PROGRESS NOTES
HISTORY OF PRESENT ILLNESS  Radha Gonzalez is a 61 y.o. male. HPI      F/u HTN HLD prediabetes obesity  Last a1c 5.9   a1c is 5.8    bp readings at home wnl per pt  No cp or sob symptoms  Reports compliance with his medicines      Last OV  Stays active , yardwork  No cp sob or bph sxs  Due for PSA and labs  bp readings wnl at home        Patient Active Problem List    Diagnosis Date Noted    Obesity (BMI 30.0-34.9) 12/12/2018    Hyperglycemia 07/10/2012    HLD (hyperlipidemia) 07/10/2012    HTN (hypertension) 02/22/2010     Current Outpatient Medications   Medication Sig Dispense Refill    amLODIPine (NORVASC) 10 mg tablet TAKE 1 TABLET BY MOUTH ONCE DAILY 90 Tab 3    pravastatin (PRAVACHOL) 20 mg tablet Take 1 Tab by mouth daily. 90 Tab 3    lisinopril (PRINIVIL, ZESTRIL) 40 mg tablet TAKE 1 TABLET BY MOUTH ONCE DAILY 90 Tab 3    lisinopril (PRINIVIL, ZESTRIL) 40 mg tablet TAKE 1 TABLET BY MOUTH DAILY 90 Tab 3    cyclobenzaprine (FLEXERIL) 5 mg tablet Take 1 Tab by mouth three (3) times daily as needed for Muscle Spasm(s). 30 Tab 0    aspirin 81 mg Tab Take  by mouth daily.        Allergies   Allergen Reactions    Livalo [Pitavastatin] Myalgia    Pravastatin Myalgia     Patient not allergic     Social History     Tobacco Use    Smoking status: Never Smoker    Smokeless tobacco: Never Used   Substance Use Topics    Alcohol use: Yes     Frequency: 2-4 times a month     Drinks per session: 1 or 2     Comment: occasional      Lab Results   Component Value Date/Time    WBC 7.9 12/13/2018 08:51 AM    HGB 13.4 12/13/2018 08:51 AM    HCT 41.0 12/13/2018 08:51 AM    PLATELET 155 57/85/9070 08:51 AM    MCV 83 12/13/2018 08:51 AM     Lab Results   Component Value Date/Time    Hemoglobin A1c 5.9 (H) 12/13/2018 08:51 AM    Hemoglobin A1c 5.9 (H) 12/14/2017 08:51 AM    Hemoglobin A1c 6.2 (H) 11/30/2016 08:54 AM    Glucose 95 12/13/2018 08:51 AM    LDL, calculated 102 (H) 12/13/2018 08:51 AM    Creatinine 1.20 12/13/2018 08:51 AM      Lab Results   Component Value Date/Time    Cholesterol, total 177 12/13/2018 08:51 AM    HDL Cholesterol 48 12/13/2018 08:51 AM    LDL, calculated 102 (H) 12/13/2018 08:51 AM    Triglyceride 133 12/13/2018 08:51 AM     Lab Results   Component Value Date/Time    GFR est non-AA 64 12/13/2018 08:51 AM    GFR est AA 74 12/13/2018 08:51 AM    Creatinine 1.20 12/13/2018 08:51 AM    BUN 14 12/13/2018 08:51 AM    Sodium 142 12/13/2018 08:51 AM    Potassium 4.7 12/13/2018 08:51 AM    Chloride 103 12/13/2018 08:51 AM    CO2 25 12/13/2018 08:51 AM        ROS    Physical Exam   Constitutional: He appears well-developed and well-nourished. No distress. Appears stated age, obese,nad   HENT:   Head: Normocephalic. Cardiovascular: Normal rate, regular rhythm and normal heart sounds. Exam reveals no gallop and no friction rub. No murmur heard. Pulmonary/Chest: Effort normal and breath sounds normal. No respiratory distress. He has no wheezes. He has no rales. He exhibits no tenderness. Abdominal: Soft. Musculoskeletal: He exhibits no edema. Neurological: He is alert. Psychiatric: He has a normal mood and affect. Nursing note and vitals reviewed. ASSESSMENT and PLAN  Diagnoses and all orders for this visit:    1. Prediabetes  -     AMB POC HEMOGLOBIN A1C 5.8 stable    2. Essential hypertension   Reasonable control, continue medicines, low sodium dietg  3. Pure hypercholesterolemia   Manage with diet and exericse   Statin intolerant  4. Obesity (BMI 30.0-34.9)   I have reviewed/discussed the above normal BMI with the patient. I have recommended the following interventions: dietary management education, guidance, and counseling and encourage exercise . Fartun Cary Follow-up and Dispositions    · Return in about 6 months (around 1/16/2020) for htn prediabetes labs.

## 2019-07-16 ENCOUNTER — OFFICE VISIT (OUTPATIENT)
Dept: INTERNAL MEDICINE CLINIC | Age: 64
End: 2019-07-16

## 2019-07-16 VITALS
HEART RATE: 81 BPM | BODY MASS INDEX: 33.19 KG/M2 | OXYGEN SATURATION: 99 % | RESPIRATION RATE: 16 BRPM | SYSTOLIC BLOOD PRESSURE: 131 MMHG | HEIGHT: 68 IN | DIASTOLIC BLOOD PRESSURE: 71 MMHG | WEIGHT: 219 LBS | TEMPERATURE: 97.8 F

## 2019-07-16 DIAGNOSIS — E66.9 OBESITY (BMI 30.0-34.9): ICD-10-CM

## 2019-07-16 DIAGNOSIS — R73.03 PREDIABETES: Primary | ICD-10-CM

## 2019-07-16 DIAGNOSIS — I10 ESSENTIAL HYPERTENSION: ICD-10-CM

## 2019-07-16 DIAGNOSIS — E78.00 PURE HYPERCHOLESTEROLEMIA: ICD-10-CM

## 2019-07-16 LAB — HBA1C MFR BLD HPLC: 5.8 % (ref 4.8–5.6)

## 2019-07-16 NOTE — PATIENT INSTRUCTIONS
Reviewed record in preparation for visit and have obtained necessary documentation. Identified pt with two pt identifiers(name and ). Chief Complaint Patient presents with  Diabetes  Cholesterol Problem Health Maintenance Due Topic Date Due  
 Colonoscopy  01/10/2020 Mr. Pili Galdamez has a reminder for a \"due or due soon\" health maintenance. I have asked that he discuss this further with his primary care provider for follow-up on this health maintenance. Coordination of Care Questionnaire: 
:  
 
1) Have you been to an emergency room, urgent care clinic since your last visit? No 
2) Have you seen or consulted any other health care providers outside of [address] [place] [address] since your last visit? No 
 
3) In the event something were to happen to you and you were unable to speak on your behalf, do you have an Advance Directive/ Living Will in place stating your wishes? No 
 
Do you have an Advance Directive on file? NO 
4) Are you interested in receiving information on Advance Directives? No 
Office Policies Phone calls/patient messages: Please allow up to 24 hours for someone in the office to contact you about your call or message. Be mindful your provider may be out of the office or your message may require further review. We encourage you to use [employer] for your messages as this is a faster, more efficient way to communicate with our office Medication Refills: 
         
Prescription medications require 48-72 business hours to process. We encourage you to use [employer] for your refills. For controlled medications: Please allow 72 business hours to process. Certain medications may require you to  a written prescription at our office. NO narcotic/controlled medications will be prescribed after 4pm Monday through Friday or on weekends Form/Paperwork Completion: Please note a $25 fee may incur for all paperwork for completed by our providers. We ask that you allow 7-10 business days. Pre-payment is due prior to picking up/faxing the completed form. You may also download your forms to [employer] to have your doctor print off. Rosalinda Ireland

## 2019-07-16 NOTE — PROGRESS NOTES
Chief Complaint   Patient presents with    Diabetes     6  month follow up    Cholesterol Problem     6 month follow up    Labs     Fasting

## 2020-01-09 RX ORDER — LISINOPRIL 40 MG/1
TABLET ORAL
Qty: 90 TAB | Refills: 3 | Status: SHIPPED | OUTPATIENT
Start: 2020-01-09 | End: 2021-01-08 | Stop reason: SDUPTHER

## 2020-01-19 NOTE — PROGRESS NOTES
HISTORY OF PRESENT ILLNESS  Mitzi Serrato is a 59 y.o. male. HPI      F/u HTN HLD  prediabetes obesity  Last a1c 5.8  Tolerating pravastatin  Retired  Walks and works around [employer]  No cp or sob      Last OV  Last a1c 5.9   a1c is 5.8    bp readings at home wnl per pt  No cp or sob symptoms  Reports compliance with his medicines       Patient Active Problem List    Diagnosis Date Noted    Obesity (BMI 30.0-34.9) 12/12/2018    Hyperglycemia 07/10/2012    HLD (hyperlipidemia) 07/10/2012    HTN (hypertension) 02/22/2010     Current Outpatient Medications   Medication Sig Dispense Refill    lisinopril (PRINIVIL, ZESTRIL) 40 mg tablet TAKE 1 TABLET BY MOUTH ONCE DAILY 90 Tab 3    amLODIPine (NORVASC) 10 mg tablet TAKE 1 TABLET BY MOUTH ONCE DAILY 90 Tab 3    pravastatin (PRAVACHOL) 20 mg tablet Take 1 Tab by mouth daily. 90 Tab 3    cyclobenzaprine (FLEXERIL) 5 mg tablet Take 1 Tab by mouth three (3) times daily as needed for Muscle Spasm(s). 30 Tab 0    aspirin 81 mg Tab Take  by mouth daily.        Allergies   Allergen Reactions    Livalo [Pitavastatin] Myalgia    Pravastatin Myalgia     Patient not allergic      Lab Results   Component Value Date/Time    WBC 7.9 12/13/2018 08:51 AM    HGB 13.4 12/13/2018 08:51 AM    HCT 41.0 12/13/2018 08:51 AM    PLATELET 976 41/35/4393 08:51 AM    MCV 83 12/13/2018 08:51 AM     Lab Results   Component Value Date/Time    Hemoglobin A1c 5.9 (H) 12/13/2018 08:51 AM    Hemoglobin A1c 5.9 (H) 12/14/2017 08:51 AM    Hemoglobin A1c 6.2 (H) 11/30/2016 08:54 AM    Glucose 95 12/13/2018 08:51 AM    LDL, calculated 102 (H) 12/13/2018 08:51 AM    Creatinine 1.20 12/13/2018 08:51 AM      Lab Results   Component Value Date/Time    Cholesterol, total 177 12/13/2018 08:51 AM    HDL Cholesterol 48 12/13/2018 08:51 AM    LDL, calculated 102 (H) 12/13/2018 08:51 AM    Triglyceride 133 12/13/2018 08:51 AM     Lab Results   Component Value Date/Time    GFR est non-AA 64 12/13/2018 08:51 AM    GFR est AA 74 12/13/2018 08:51 AM    Creatinine 1.20 12/13/2018 08:51 AM    BUN 14 12/13/2018 08:51 AM    Sodium 142 12/13/2018 08:51 AM    Potassium 4.7 12/13/2018 08:51 AM    Chloride 103 12/13/2018 08:51 AM    CO2 25 12/13/2018 08:51 AM        ROS    Physical Exam  Vitals signs and nursing note reviewed. Constitutional:       General: He is not in acute distress. Appearance: He is well-developed. He is obese. Comments: Appears stated age   HENT:      Head: Normocephalic. Cardiovascular:      Rate and Rhythm: Normal rate and regular rhythm. Heart sounds: Normal heart sounds. Pulmonary:      Effort: Pulmonary effort is normal.      Breath sounds: Normal breath sounds. Abdominal:      Palpations: Abdomen is soft. Neurological:      Mental Status: He is alert. ASSESSMENT and PLAN  Diagnoses and all orders for this visit:    1. Prediabetes  -     METABOLIC PANEL, COMPREHENSIVE  -     HEMOGLOBIN A1C WITH EAG   Continue with low calorie diet and exercise   2. Essential hypertension  -     CBC W/O DIFF  -     METABOLIC PANEL, COMPREHENSIVE   controlled  3. Pure hypercholesterolemia  -     METABOLIC PANEL, COMPREHENSIVE  -     LIPID PANEL  -     TSH 3RD GENERATION   Continue pravastatin  4. Prostate cancer screening  -     PSA W/ REFLX FREE PSA    5. Colon cancer screening  -     REFERRAL TO GASTROENTEROLOGY      Follow-up and Dispositions    · Return in about 7 months (around 8/21/2020) for medicare wellness.

## 2020-01-21 ENCOUNTER — OFFICE VISIT (OUTPATIENT)
Dept: INTERNAL MEDICINE CLINIC | Age: 65
End: 2020-01-21

## 2020-01-21 VITALS
RESPIRATION RATE: 16 BRPM | DIASTOLIC BLOOD PRESSURE: 78 MMHG | OXYGEN SATURATION: 97 % | WEIGHT: 222 LBS | BODY MASS INDEX: 33.65 KG/M2 | SYSTOLIC BLOOD PRESSURE: 136 MMHG | TEMPERATURE: 97.6 F | HEIGHT: 68 IN | HEART RATE: 87 BPM

## 2020-01-21 DIAGNOSIS — I10 ESSENTIAL HYPERTENSION: ICD-10-CM

## 2020-01-21 DIAGNOSIS — R73.03 PREDIABETES: Primary | ICD-10-CM

## 2020-01-21 DIAGNOSIS — Z12.5 PROSTATE CANCER SCREENING: ICD-10-CM

## 2020-01-21 DIAGNOSIS — E78.00 PURE HYPERCHOLESTEROLEMIA: ICD-10-CM

## 2020-01-21 DIAGNOSIS — Z12.11 COLON CANCER SCREENING: ICD-10-CM

## 2020-01-21 NOTE — PATIENT INSTRUCTIONS
Office Policies Phone calls/patient messages: Please allow up to 24 hours for someone in the office to contact you about your call or message. Be mindful your provider may be out of the office or your message may require further review. We encourage you to use [employer] for your messages as this is a faster, more efficient way to communicate with our office Medication Refills: 
         
Prescription medications require 48-72 business hours to process. We encourage you to use [employer] for your refills. For controlled medications: Please allow 72 business hours to process. Certain medications may require you to  a written prescription at our office. NO narcotic/controlled medications will be prescribed after 4pm Monday through Friday or on weekends Form/Paperwork Completion: 
         
Please note a $25 fee may incur for all paperwork for completed by our providers. We ask that you allow 7-10 business days. Pre-payment is due prior to picking up/faxing the completed form. You may also download your forms to [employer] to have your doctor print off.

## 2020-01-22 LAB
ALBUMIN SERPL-MCNC: 4.4 G/DL (ref 3.8–4.8)
ALBUMIN/GLOB SERPL: 1.6 {RATIO} (ref 1.2–2.2)
ALP SERPL-CCNC: 86 IU/L (ref 39–117)
ALT SERPL-CCNC: 20 IU/L (ref 0–44)
AST SERPL-CCNC: 19 IU/L (ref 0–40)
BILIRUB SERPL-MCNC: 0.5 MG/DL (ref 0–1.2)
BUN SERPL-MCNC: 16 MG/DL (ref 8–27)
BUN/CREAT SERPL: 12 (ref 10–24)
CALCIUM SERPL-MCNC: 9.5 MG/DL (ref 8.6–10.2)
CHLORIDE SERPL-SCNC: 99 MMOL/L (ref 96–106)
CHOLEST SERPL-MCNC: 186 MG/DL (ref 100–199)
CO2 SERPL-SCNC: 24 MMOL/L (ref 20–29)
CREAT SERPL-MCNC: 1.35 MG/DL (ref 0.76–1.27)
ERYTHROCYTE [DISTWIDTH] IN BLOOD BY AUTOMATED COUNT: 16.3 % (ref 11.6–15.4)
EST. AVERAGE GLUCOSE BLD GHB EST-MCNC: 126 MG/DL
GLOBULIN SER CALC-MCNC: 2.7 G/DL (ref 1.5–4.5)
GLUCOSE SERPL-MCNC: 100 MG/DL (ref 65–99)
HBA1C MFR BLD: 6 % (ref 4.8–5.6)
HCT VFR BLD AUTO: 43.3 % (ref 37.5–51)
HDLC SERPL-MCNC: 56 MG/DL
HGB BLD-MCNC: 14.5 G/DL (ref 13–17.7)
LDLC SERPL CALC-MCNC: 102 MG/DL (ref 0–99)
MCH RBC QN AUTO: 27.3 PG (ref 26.6–33)
MCHC RBC AUTO-ENTMCNC: 33.5 G/DL (ref 31.5–35.7)
MCV RBC AUTO: 82 FL (ref 79–97)
PLATELET # BLD AUTO: 223 X10E3/UL (ref 150–450)
POTASSIUM SERPL-SCNC: 4.3 MMOL/L (ref 3.5–5.2)
PROT SERPL-MCNC: 7.1 G/DL (ref 6–8.5)
PSA SERPL-MCNC: 2.1 NG/ML (ref 0–4)
RBC # BLD AUTO: 5.31 X10E6/UL (ref 4.14–5.8)
REFLEX CRITERIA: NORMAL
SODIUM SERPL-SCNC: 139 MMOL/L (ref 134–144)
TRIGL SERPL-MCNC: 140 MG/DL (ref 0–149)
TSH SERPL DL<=0.005 MIU/L-ACNC: 2.77 UIU/ML (ref 0.45–4.5)
VLDLC SERPL CALC-MCNC: 28 MG/DL (ref 5–40)
WBC # BLD AUTO: 8.4 X10E3/UL (ref 3.4–10.8)

## 2020-07-09 RX ORDER — AMLODIPINE BESYLATE 10 MG/1
TABLET ORAL
Qty: 90 TAB | Refills: 3 | Status: SHIPPED | OUTPATIENT
Start: 2020-07-09 | End: 2021-07-08 | Stop reason: SDUPTHER

## 2020-08-24 NOTE — PROGRESS NOTES
HISTORY OF PRESENT ILLNESS  Casey Willams is a 72 y.o. male. HPI   Casey Willams is a 72 y.o. male being evaluated by a Virtual Visit (video visit) encounter to address concerns as mentioned above. A caregiver was present when appropriate. Due to this being a TeleHealth encounter (During [facility]-09 public health emergency), evaluation of the following organ systems was limited: Vitals/Constitutional/EENT/Resp/CV/GI//MS/Neuro/Skin/Heme-Lymph-Imm. Pursuant to the emergency declaration under the [facility], [address] [place] [address] authority and the [employer] Act, this Virtual Visit was conducted with patient's (and/or legal guardian's) consent, to reduce the risk of exposure to COVID-19 and provide necessary medical care. Services were provided through a video synchronous discussion virtually to substitute for in-person encounter. --Bhanu Noel MD on 8/24/2020 at 8:23 AM    An electronic signature was used to authenticate this note. F/u HTN HLD  prediabetes obesity and cpe  bp 130/82  t 97.7  Exercises-yard work etc  Due for pneumovax 23 and screening colonoscopy  Weight down a few lbs with changes in diet and portions  Feels ell overall  Last OV       Last a1c 5.8  Tolerating pravastatin  Retired  Walks and works around [employer]  No cp or sob       Patient Active Problem List    Diagnosis Date Noted    Obesity (BMI 30.0-34.9) 12/12/2018    Hyperglycemia 07/10/2012    HLD (hyperlipidemia) 07/10/2012    HTN (hypertension) 02/22/2010     Current Outpatient Medications   Medication Sig Dispense Refill    amLODIPine (NORVASC) 10 mg tablet TAKE 1 TABLET BY MOUTH ONCE DAILY 90 Tab 3    pravastatin (PRAVACHOL) 20 mg tablet TAKE 1 TABLET BY MOUTH EVERY DAY 90 Tab 1    lisinopril (PRINIVIL, ZESTRIL) 40 mg tablet TAKE 1 TABLET BY MOUTH ONCE DAILY 90 Tab 3    aspirin 81 mg Tab Take  by mouth daily.       cyclobenzaprine (FLEXERIL) 5 mg tablet Take 1 Tab by mouth three (3) times daily as needed for Muscle Spasm(s). 30 Tab 0     Allergies   Allergen Reactions    Livalo [Pitavastatin] Myalgia    Pravastatin Myalgia     Patient not allergic      Lab Results   Component Value Date/Time    WBC 8.4 01/21/2020 09:31 AM    HGB 14.5 01/21/2020 09:31 AM    HCT 43.3 01/21/2020 09:31 AM    PLATELET 264 76/06/3610 09:31 AM    MCV 82 01/21/2020 09:31 AM     Lab Results   Component Value Date/Time    Hemoglobin A1c 6.0 (H) 01/21/2020 09:31 AM    Hemoglobin A1c 5.9 (H) 12/13/2018 08:51 AM    Hemoglobin A1c 5.9 (H) 12/14/2017 08:51 AM    Glucose 100 (H) 01/21/2020 09:31 AM    LDL, calculated 102 (H) 01/21/2020 09:31 AM    Creatinine 1.35 (H) 01/21/2020 09:31 AM      Lab Results   Component Value Date/Time    Cholesterol, total 186 01/21/2020 09:31 AM    HDL Cholesterol 56 01/21/2020 09:31 AM    LDL, calculated 102 (H) 01/21/2020 09:31 AM    Triglyceride 140 01/21/2020 09:31 AM     Lab Results   Component Value Date/Time    GFR est non-AA 55 (L) 01/21/2020 09:31 AM    GFR est AA 64 01/21/2020 09:31 AM    Creatinine 1.35 (H) 01/21/2020 09:31 AM    BUN 16 01/21/2020 09:31 AM    Sodium 139 01/21/2020 09:31 AM    Potassium 4.3 01/21/2020 09:31 AM    Chloride 99 01/21/2020 09:31 AM    CO2 24 01/21/2020 09:31 AM        ROS    Physical Exam  Constitutional:       Appearance: Normal appearance. He is obese. Pulmonary:      Effort: Pulmonary effort is normal.   Neurological:      Mental Status: He is alert. ASSESSMENT and PLAN  Diagnoses and all orders for this visit:    1. Essential hypertension  -     METABOLIC PANEL, COMPREHENSIVE   controlled  2. Pure hypercholesterolemia  -     METABOLIC PANEL, COMPREHENSIVE  -     LIPID PANEL   CONTINUE STATIN  3.  Prediabetes  -     METABOLIC PANEL, COMPREHENSIVE  -     HEMOGLOBIN A1C WITH EAG   Low carb diet recommended          medicare wellness in 6 months

## 2020-08-25 ENCOUNTER — VIRTUAL VISIT (OUTPATIENT)
Dept: INTERNAL MEDICINE CLINIC | Age: 65
End: 2020-08-25
Payer: MEDICARE

## 2020-08-25 DIAGNOSIS — E78.00 PURE HYPERCHOLESTEROLEMIA: ICD-10-CM

## 2020-08-25 DIAGNOSIS — R73.03 PREDIABETES: ICD-10-CM

## 2020-08-25 DIAGNOSIS — I10 ESSENTIAL HYPERTENSION: Primary | ICD-10-CM

## 2020-08-25 PROCEDURE — 99213 OFFICE O/P EST LOW 20 MIN: CPT | Performed by: INTERNAL MEDICINE

## 2020-08-25 PROCEDURE — G0463 HOSPITAL OUTPT CLINIC VISIT: HCPCS | Performed by: INTERNAL MEDICINE

## 2020-08-25 NOTE — PATIENT INSTRUCTIONS
This is an established visit conducted via telemedicine. The patient has been instructed that this meets HIPAA criteria and acknowledges and agrees to this method of visitation.  
 
Brooklyn Verdin [place] 
[birth year] 
7:87 AM

## 2020-12-29 RX ORDER — PRAVASTATIN SODIUM 20 MG/1
TABLET ORAL
Qty: 90 TAB | Refills: 1 | Status: SHIPPED | OUTPATIENT
Start: 2020-12-29 | End: 2021-06-23

## 2020-12-29 NOTE — TELEPHONE ENCOUNTER
Future Appointments:  Future Appointments   Date Time Provider Idris Draper   2/25/2021  1:30 PM Rah Rodriguez MD [facility] BS AMB        Last Appointment With Me:  8/25/2020     Requested Prescriptions      No prescriptions requested or ordered in this encounter

## 2021-01-08 RX ORDER — LISINOPRIL 40 MG/1
40 TABLET ORAL DAILY
Qty: 90 TAB | Refills: 3 | Status: SHIPPED | OUTPATIENT
Start: 2021-01-08 | End: 2022-01-02

## 2021-02-25 ENCOUNTER — OFFICE VISIT (OUTPATIENT)
Dept: INTERNAL MEDICINE CLINIC | Age: 66
End: 2021-02-25
Payer: MEDICARE

## 2021-02-25 ENCOUNTER — HOSPITAL ENCOUNTER (OUTPATIENT)
Dept: LAB | Age: 66
Discharge: HOME | End: 2021-02-25
Payer: MEDICARE

## 2021-02-25 VITALS
TEMPERATURE: 96.7 F | RESPIRATION RATE: 16 BRPM | HEIGHT: 68 IN | SYSTOLIC BLOOD PRESSURE: 138 MMHG | OXYGEN SATURATION: 98 % | WEIGHT: 222 LBS | BODY MASS INDEX: 33.65 KG/M2 | HEART RATE: 81 BPM | DIASTOLIC BLOOD PRESSURE: 70 MMHG

## 2021-02-25 DIAGNOSIS — Z00.00 MEDICARE ANNUAL WELLNESS VISIT, SUBSEQUENT: ICD-10-CM

## 2021-02-25 DIAGNOSIS — E78.00 PURE HYPERCHOLESTEROLEMIA: ICD-10-CM

## 2021-02-25 DIAGNOSIS — R73.03 PREDIABETES: ICD-10-CM

## 2021-02-25 DIAGNOSIS — Z12.11 COLON CANCER SCREENING: ICD-10-CM

## 2021-02-25 DIAGNOSIS — Z00.00 WELCOME TO MEDICARE PREVENTIVE VISIT: Primary | ICD-10-CM

## 2021-02-25 DIAGNOSIS — Z12.5 PROSTATE CANCER SCREENING: ICD-10-CM

## 2021-02-25 DIAGNOSIS — I10 ESSENTIAL HYPERTENSION: ICD-10-CM

## 2021-02-25 DIAGNOSIS — R94.31 ABNORMAL EKG: ICD-10-CM

## 2021-02-25 PROCEDURE — G8510 SCR DEP NEG, NO PLAN REQD: HCPCS | Performed by: INTERNAL MEDICINE

## 2021-02-25 PROCEDURE — 84443 ASSAY THYROID STIM HORMONE: CPT

## 2021-02-25 PROCEDURE — G8427 DOCREV CUR MEDS BY ELIG CLIN: HCPCS | Performed by: INTERNAL MEDICINE

## 2021-02-25 PROCEDURE — 83036 HEMOGLOBIN GLYCOSYLATED A1C: CPT

## 2021-02-25 PROCEDURE — 36415 COLL VENOUS BLD VENIPUNCTURE: CPT

## 2021-02-25 PROCEDURE — 3017F COLORECTAL CA SCREEN DOC REV: CPT | Performed by: INTERNAL MEDICINE

## 2021-02-25 PROCEDURE — G8752 SYS BP LESS 140: HCPCS | Performed by: INTERNAL MEDICINE

## 2021-02-25 PROCEDURE — G0463 HOSPITAL OUTPT CLINIC VISIT: HCPCS | Performed by: INTERNAL MEDICINE

## 2021-02-25 PROCEDURE — 99214 OFFICE O/P EST MOD 30 MIN: CPT | Performed by: INTERNAL MEDICINE

## 2021-02-25 PROCEDURE — G8754 DIAS BP LESS 90: HCPCS | Performed by: INTERNAL MEDICINE

## 2021-02-25 PROCEDURE — 80053 COMPREHEN METABOLIC PANEL: CPT

## 2021-02-25 PROCEDURE — G0403 EKG FOR INITIAL PREVENT EXAM: HCPCS | Performed by: INTERNAL MEDICINE

## 2021-02-25 PROCEDURE — G8536 NO DOC ELDER MAL SCRN: HCPCS | Performed by: INTERNAL MEDICINE

## 2021-02-25 PROCEDURE — G0402 INITIAL PREVENTIVE EXAM: HCPCS | Performed by: INTERNAL MEDICINE

## 2021-02-25 PROCEDURE — G8417 CALC BMI ABV UP PARAM F/U: HCPCS | Performed by: INTERNAL MEDICINE

## 2021-02-25 PROCEDURE — 1101F PT FALLS ASSESS-DOCD LE1/YR: CPT | Performed by: INTERNAL MEDICINE

## 2021-02-25 PROCEDURE — 85027 COMPLETE CBC AUTOMATED: CPT

## 2021-02-25 PROCEDURE — 84153 ASSAY OF PSA TOTAL: CPT

## 2021-02-25 PROCEDURE — 80061 LIPID PANEL: CPT

## 2021-02-25 PROCEDURE — 93005 ELECTROCARDIOGRAM TRACING: CPT | Performed by: INTERNAL MEDICINE

## 2021-02-25 NOTE — PATIENT INSTRUCTIONS
Medicare Wellness Visit, Male The best way to live healthy is to have a lifestyle where you eat a well-balanced diet, exercise regularly, limit alcohol use, and quit all forms of tobacco/nicotine, if applicable. Regular preventive services are another way to keep healthy. Preventive services (vaccines, screening tests, monitoring & exams) can help personalize your care plan, which helps you manage your own care. Screening tests can find health problems at the earliest stages, when they are easiest to treat. [facility] follows the current, evidence-based guidelines published by the [facility] ([facility]) when recommending preventive services for our patients. Because we follow these guidelines, sometimes recommendations change over time as research supports it. (For example, a prostate screening blood test is no longer routinely recommended for men with no symptoms). Of course, you and your doctor may decide to screen more often for some diseases, based on your risk and co-morbidities (chronic disease you are already diagnosed with). Preventive services for you include: - Medicare offers their members a free annual wellness visit, which is time for you and your primary care provider to discuss and plan for your preventive service needs. Take advantage of this benefit every year! 
-All adults over age 72 should receive the recommended pneumonia vaccines. Current USPSTF guidelines recommend a series of two vaccines for the best pneumonia protection.  
-All adults should have a flu vaccine yearly and tetanus vaccine every 10 years. 
-All adults age 48 and older should receive the shingles vaccines (series of two vaccines). -All adults age 38-68 who are overweight should have a diabetes screening test once every three years. -Other screening tests & preventive services for persons with diabetes include: an eye exam to screen for diabetic retinopathy, a kidney function test, a foot exam, and stricter control over your cholesterol.  
-Cardiovascular screening for adults with routine risk involves an electrocardiogram (ECG) at intervals determined by the provider.  
-Colorectal cancer screening should be done for adults age 54-65 with no increased risk factors for colorectal cancer. There are a number of acceptable methods of screening for this type of cancer. Each test has its own benefits and drawbacks. Discuss with your provider what is most appropriate for you during your annual wellness visit. The different tests include: colonoscopy (considered the best screening method), a fecal occult blood test, a fecal DNA test, and sigmoidoscopy. 
-All adults born between [facility] should be screened once for Hepatitis C. 
-An Abdominal Aortic Aneurysm (AAA) Screening is recommended for men age 73-68 who has ever smoked in their lifetime. Here is a list of your current Health Maintenance items (your personalized list of preventive services) with a due date: 
Health Maintenance Due Topic Date Due  
 COVID-19 Vaccine (1 of 2) 08/10/1971  Colorectal Screening  01/10/2020  Cholesterol Test   01/21/2021

## 2021-02-25 NOTE — PROGRESS NOTES
HISTORY OF PRESENT ILLNESS  Yonady Miguel is a 72 y.o. male. HPI   F/u HTN HLD  prediabetes  obesity and welcome to medicare----    Due for colon screen-last done 2010 in [address] well overall  Home bp arouind 611-548 systolic per pt  No cp or sob  Weight has been stable    Last OV    bp 130/82  t 97.7  Exercises-yard work etc  Due for pneumovax 23 and screening colonoscopy  Weight down a few lbs with changes in diet and portions  Feels ell overall    Patient Active Problem List    Diagnosis Date Noted    Obesity (BMI 30.0-34.9) 12/12/2018    Hyperglycemia 07/10/2012    HLD (hyperlipidemia) 07/10/2012    HTN (hypertension) 02/22/2010     Current Outpatient Medications   Medication Sig Dispense Refill    lisinopriL (PRINIVIL, ZESTRIL) 40 mg tablet Take 1 Tab by mouth daily. TAKE 1 TABLET BY MOUTH ONCE DAILY 90 Tab 3    pravastatin (PRAVACHOL) 20 mg tablet TAKE 1 TABLET BY MOUTH EVERY DAY 90 Tab 1    amLODIPine (NORVASC) 10 mg tablet TAKE 1 TABLET BY MOUTH ONCE DAILY 90 Tab 3    aspirin 81 mg Tab Take  by mouth daily.  cyclobenzaprine (FLEXERIL) 5 mg tablet Take 1 Tab by mouth three (3) times daily as needed for Muscle Spasm(s).  30 Tab 0     Allergies   Allergen Reactions    Livalo [Pitavastatin] Myalgia    Pravastatin Myalgia     Patient not allergic      Lab Results   Component Value Date/Time    WBC 8.4 01/21/2020 09:31 AM    HGB 14.5 01/21/2020 09:31 AM    HCT 43.3 01/21/2020 09:31 AM    PLATELET 558 66/90/6908 09:31 AM    MCV 82 01/21/2020 09:31 AM     Lab Results   Component Value Date/Time    Hemoglobin A1c 6.0 (H) 01/21/2020 09:31 AM    Hemoglobin A1c 5.9 (H) 12/13/2018 08:51 AM    Hemoglobin A1c 5.9 (H) 12/14/2017 08:51 AM    Glucose 100 (H) 01/21/2020 09:31 AM    LDL, calculated 102 (H) 01/21/2020 09:31 AM    Creatinine 1.35 (H) 01/21/2020 09:31 AM      Lab Results   Component Value Date/Time    Cholesterol, total 186 01/21/2020 09:31 AM    HDL Cholesterol 56 01/21/2020 09:31 AM LDL, calculated 102 (H) 01/21/2020 09:31 AM    Triglyceride 140 01/21/2020 09:31 AM     Lab Results   Component Value Date/Time    GFR est non-AA 55 (L) 01/21/2020 09:31 AM    GFR est AA 64 01/21/2020 09:31 AM    Creatinine 1.35 (H) 01/21/2020 09:31 AM    BUN 16 01/21/2020 09:31 AM    Sodium 139 01/21/2020 09:31 AM    Potassium 4.3 01/21/2020 09:31 AM    Chloride 99 01/21/2020 09:31 AM    CO2 24 01/21/2020 09:31 AM        ROS    Physical Exam  Vitals signs and nursing note reviewed. Constitutional:       General: He is not in acute distress. Appearance: He is well-developed. He is obese. Comments: Appears stated age   HENT:      Head: Normocephalic. Cardiovascular:      Rate and Rhythm: Normal rate and regular rhythm. Heart sounds: Normal heart sounds. Pulmonary:      Effort: Pulmonary effort is normal.      Breath sounds: Normal breath sounds. Abdominal:      Palpations: Abdomen is soft. Neurological:      Mental Status: He is alert. ASSESSMENT and PLAN  Diagnoses and all orders for this visit:    1. Welcome to Medicare preventive visit  -     AMB POC EKG ROUTINE W/ 12 LEADS, INTER & REP    2. Essential hypertension  -     CBC W/O DIFF  -     METABOLIC PANEL, COMPREHENSIVE    3. Pure hypercholesterolemia  -     METABOLIC PANEL, COMPREHENSIVE  -     LIPID PANEL  -     TSH 3RD GENERATION    4. Prediabetes  -     HEMOGLOBIN A1C WITH EAG    5. Prostate cancer screening  -     PSA SCREENING (SCREENING)    6. Abnormal EKG           This is a \"Welcome to [employer]"  Initial Preventive Physical Examination (IPPE) providing Personalized Prevention Plan Services (Performed in the first 12 months of enrollment)    I have reviewed the patient's medical history in detail and updated the computerized patient record.      Depression Risk Screen     3 most recent PHQ Screens 2/25/2021   Little interest or pleasure in doing things Not at all   Feeling down, depressed, irritable, or hopeless Not at all Total Score PHQ 2 0       Alcohol Risk Screen    Do you average more than 1 drink per night or more than 7 drinks a week: No    In the past three months have you have had more than 4 drinks containing alcohol on one occasion: No          Functional Ability and Level of Safety    Diet: No special diet      Hearing: Hearing is good. Vision Screening:  Vision is good. No exam data present      Activities of Daily Living: The home contains: no safety equipment. Patient does total self care      Ambulation: with no difficulty      Exercise level: moderately active     Fall Risk Screen:  Fall Risk Assessment, last 12 mths 2/25/2021   Able to walk? Yes   Fall in past 12 months? 0   Do you feel unsteady? 0   Are you worried about falling 0      Abuse Screen:  Patient is not abused       Screening EKG   EKG order placed: Yes    End of Life Planning   Advanced care planning directives were discussed with the patient and /or family/caregiver. Assessment/Plan   Education and counseling provided:  Are appropriate based on today's review and evaluation  End-of-Life planning (with patient's consent)  Prostate cancer screening tests (PSA, covered annually)  Colorectal cancer screening tests  covid vaccine recommended    Diagnoses and all orders for this visit:    1. Welcome to Medicare preventive visit  -     AMB POC EKG ROUTINE W/ 12 LEADS, INTER & REP    2. Essential hypertension  -     CBC W/O DIFF  -     METABOLIC PANEL, COMPREHENSIVE   bp monitoring  3. Pure hypercholesterolemia  -     METABOLIC PANEL, COMPREHENSIVE  -     LIPID PANEL  -     TSH 3RD GENERATION   On pravastatin  4. Prediabetes  -     HEMOGLOBIN A1C WITH EAG   Weight reduction needed-discussed  5. Prostate cancer screening  -     PSA SCREENING (SCREENING)    6.  Abnormal EKG   Refer to cardiologist at [facility]   No cp or angina symtpoms      Health Maintenance Due     Health Maintenance Due   Topic Date Due    COVID-19 Vaccine (1 of 2) 08/10/1971    Colorectal Cancer Screening Combo  01/10/2020    Medicare Yearly Exam  08/25/2020    Lipid Screen  01/21/2021       Patient Care Team   Patient Care Team:  Nicolasa Howe MD as PCP - Cyndi Jarquin MD as PCP - [facility] Empaneled Provider    History     Past Medical History:   Diagnosis Date    Hypertension       No past surgical history on file. Current Outpatient Medications   Medication Sig Dispense Refill    lisinopriL (PRINIVIL, ZESTRIL) 40 mg tablet Take 1 Tab by mouth daily. TAKE 1 TABLET BY MOUTH ONCE DAILY 90 Tab 3    pravastatin (PRAVACHOL) 20 mg tablet TAKE 1 TABLET BY MOUTH EVERY DAY 90 Tab 1    amLODIPine (NORVASC) 10 mg tablet TAKE 1 TABLET BY MOUTH ONCE DAILY 90 Tab 3    aspirin 81 mg Tab Take  by mouth daily.  cyclobenzaprine (FLEXERIL) 5 mg tablet Take 1 Tab by mouth three (3) times daily as needed for Muscle Spasm(s).  30 Tab 0     Allergies   Allergen Reactions    Livalo [Pitavastatin] Myalgia    Pravastatin Myalgia     Patient not allergic       Family History   Problem Relation Age of Onset    Cancer Mother         breast    Diabetes Mother     High Cholesterol Mother     Stroke Mother     Stroke Father      Social History     Tobacco Use    Smoking status: Never Smoker    Smokeless tobacco: Never Used   Substance Use Topics    Alcohol use: Yes     Frequency: 2-4 times a month     Drinks per session: 1 or 2     Comment: occasional

## 2021-02-26 LAB
ALBUMIN SERPL-MCNC: 4.7 G/DL (ref 3.8–4.8)
ALBUMIN/GLOB SERPL: 2 {RATIO} (ref 1.2–2.2)
ALP SERPL-CCNC: 89 IU/L (ref 39–117)
ALT SERPL-CCNC: 21 IU/L (ref 0–44)
AST SERPL-CCNC: 24 IU/L (ref 0–40)
BILIRUB SERPL-MCNC: 0.5 MG/DL (ref 0–1.2)
BUN SERPL-MCNC: 18 MG/DL (ref 8–27)
BUN/CREAT SERPL: 14 (ref 10–24)
CALCIUM SERPL-MCNC: 9.4 MG/DL (ref 8.6–10.2)
CHLORIDE SERPL-SCNC: 103 MMOL/L (ref 96–106)
CHOLEST SERPL-MCNC: 172 MG/DL (ref 100–199)
CO2 SERPL-SCNC: 23 MMOL/L (ref 20–29)
CREAT SERPL-MCNC: 1.25 MG/DL (ref 0.76–1.27)
ERYTHROCYTE [DISTWIDTH] IN BLOOD BY AUTOMATED COUNT: 16 % (ref 11.6–15.4)
EST. AVERAGE GLUCOSE BLD GHB EST-MCNC: 123 MG/DL
GLOBULIN SER CALC-MCNC: 2.4 G/DL (ref 1.5–4.5)
GLUCOSE SERPL-MCNC: 89 MG/DL (ref 65–99)
HBA1C MFR BLD: 5.9 % (ref 4.8–5.6)
HCT VFR BLD AUTO: 41.5 % (ref 37.5–51)
HDLC SERPL-MCNC: 53 MG/DL
HGB BLD-MCNC: 14.1 G/DL (ref 13–17.7)
LDLC SERPL CALC-MCNC: 101 MG/DL (ref 0–99)
MCH RBC QN AUTO: 27.5 PG (ref 26.6–33)
MCHC RBC AUTO-ENTMCNC: 34 G/DL (ref 31.5–35.7)
MCV RBC AUTO: 81 FL (ref 79–97)
PLATELET # BLD AUTO: 197 X10E3/UL (ref 150–450)
POTASSIUM SERPL-SCNC: 4.1 MMOL/L (ref 3.5–5.2)
PROT SERPL-MCNC: 7.1 G/DL (ref 6–8.5)
PSA SERPL-MCNC: 2.2 NG/ML (ref 0–4)
RBC # BLD AUTO: 5.13 X10E6/UL (ref 4.14–5.8)
SODIUM SERPL-SCNC: 141 MMOL/L (ref 134–144)
TRIGL SERPL-MCNC: 98 MG/DL (ref 0–149)
TSH SERPL DL<=0.005 MIU/L-ACNC: 2.02 UIU/ML (ref 0.45–4.5)
VLDLC SERPL CALC-MCNC: 18 MG/DL (ref 5–40)
WBC # BLD AUTO: 8.7 X10E3/UL (ref 3.4–10.8)

## 2021-03-10 ENCOUNTER — OFFICE VISIT (OUTPATIENT)
Dept: CARDIOLOGY CLINIC | Age: 66
End: 2021-03-10
Payer: MEDICARE

## 2021-03-10 VITALS
HEART RATE: 76 BPM | BODY MASS INDEX: 33.78 KG/M2 | RESPIRATION RATE: 16 BRPM | OXYGEN SATURATION: 98 % | DIASTOLIC BLOOD PRESSURE: 80 MMHG | SYSTOLIC BLOOD PRESSURE: 120 MMHG | HEIGHT: 68 IN | WEIGHT: 222.9 LBS

## 2021-03-10 DIAGNOSIS — E78.2 MIXED HYPERLIPIDEMIA: ICD-10-CM

## 2021-03-10 DIAGNOSIS — R07.2 PRECORDIAL PAIN: ICD-10-CM

## 2021-03-10 DIAGNOSIS — R94.31 ABNORMAL EKG: Primary | ICD-10-CM

## 2021-03-10 DIAGNOSIS — I10 ESSENTIAL HYPERTENSION: ICD-10-CM

## 2021-03-10 PROCEDURE — G8417 CALC BMI ABV UP PARAM F/U: HCPCS | Performed by: INTERNAL MEDICINE

## 2021-03-10 PROCEDURE — G0463 HOSPITAL OUTPT CLINIC VISIT: HCPCS | Performed by: INTERNAL MEDICINE

## 2021-03-10 PROCEDURE — 3017F COLORECTAL CA SCREEN DOC REV: CPT | Performed by: INTERNAL MEDICINE

## 2021-03-10 PROCEDURE — G8432 DEP SCR NOT DOC, RNG: HCPCS | Performed by: INTERNAL MEDICINE

## 2021-03-10 PROCEDURE — G8754 DIAS BP LESS 90: HCPCS | Performed by: INTERNAL MEDICINE

## 2021-03-10 PROCEDURE — G8427 DOCREV CUR MEDS BY ELIG CLIN: HCPCS | Performed by: INTERNAL MEDICINE

## 2021-03-10 PROCEDURE — 93005 ELECTROCARDIOGRAM TRACING: CPT | Performed by: INTERNAL MEDICINE

## 2021-03-10 PROCEDURE — G8536 NO DOC ELDER MAL SCRN: HCPCS | Performed by: INTERNAL MEDICINE

## 2021-03-10 PROCEDURE — 99204 OFFICE O/P NEW MOD 45 MIN: CPT | Performed by: INTERNAL MEDICINE

## 2021-03-10 PROCEDURE — 1101F PT FALLS ASSESS-DOCD LE1/YR: CPT | Performed by: INTERNAL MEDICINE

## 2021-03-10 PROCEDURE — G8752 SYS BP LESS 140: HCPCS | Performed by: INTERNAL MEDICINE

## 2021-03-10 PROCEDURE — 93010 ELECTROCARDIOGRAM REPORT: CPT | Performed by: INTERNAL MEDICINE

## 2021-03-10 NOTE — PROGRESS NOTES
1. Have you been to the ER, urgent care clinic since your last visit? Hospitalized since your last visit? No.    2. Have you seen or consulted any other health care providers outside of the [address] [place] [address] since your last visit? Include any pap smears or colon screening.    No.        Chief Complaint   Patient presents with    New Patient     referred by PCP for Abnormal EKG- some chest discomfort on exertion

## 2021-03-10 NOTE — LETTER
3/12/2021 Patient: Una Batters YOB: 1955 Date of Visit: 3/10/2021 Wood Crocker MD 
. Renetta Wallace [address] [facility] [address] [address] [employer] Dear Wood Crocker MD, Thank you for referring Mr. Erica Vanegas to [address] [place] [address] for evaluation. My notes for this consultation are attached. If you have questions, please do not hesitate to call me. I look forward to following your patient along with you. Sincerely, Pricilla Enciso MD

## 2021-03-10 NOTE — PROGRESS NOTES
[address], [address] [facility]  [phone]     Subjective: Vahe Rosenbaum is a 72 y.o. male is here for new patient consultation re abnormal EKG. He has pmhx HTN, HLD and impaired glucose tolerance. No hx smoking or illegal drug use, drinks occasionally. No family hx CAD but hx stroke both parents. Patient reports intermittent exertional left sided chest discomfort with moderate exertion which resolves upon cessation of activity. Symptom started end of last year. He has no symptoms with walking or regular activities. The patient denies shortness of breath, orthopnea, PND, LE edema, palpitations, syncope, or presyncope. Patient Active Problem List    Diagnosis Date Noted    Obesity (BMI 30.0-34.9) 12/12/2018    Hyperglycemia 07/10/2012    HLD (hyperlipidemia) 07/10/2012    HTN (hypertension) 02/22/2010      Odell Alejandro MD  Past Medical History:   Diagnosis Date    Hypertension       History reviewed. No pertinent surgical history.   Allergies   Allergen Reactions    Livalo [Pitavastatin] Myalgia    Pravastatin Myalgia     Patient not allergic      Family History   Problem Relation Age of Onset    Cancer Mother         breast    Diabetes Mother     High Cholesterol Mother     Stroke Mother     Stroke Father       Social History     Socioeconomic History    Marital status:      Spouse name: Not on file    Number of children: Not on file    Years of education: Not on file    Highest education level: Not on file   Occupational History    Not on file   Social Needs    Financial resource strain: Not on file    Food insecurity     Worry: Not on file     Inability: Not on file    Transportation needs     Medical: Not on file     Non-medical: Not on file   Tobacco Use    Smoking status: Never Smoker    Smokeless tobacco: Never Used   Substance and Sexual Activity    Alcohol use: Yes     Frequency: 2-4 times a month     Drinks per session: 1 or 2     Comment: occasional    Drug use: Never     Types: OTC, Prescription    Sexual activity: Yes     Partners: Female   Lifestyle    Physical activity     Days per week: Not on file     Minutes per session: Not on file    Stress: Not on file   Relationships    Social connections     Talks on phone: Not on file     Gets together: Not on file     Attends Rastafarian service: Not on file     Active member of club or organization: Not on file     Attends meetings of clubs or organizations: Not on file     Relationship status: Not on file    Intimate partner violence     Fear of current or ex partner: Not on file     Emotionally abused: Not on file     Physically abused: Not on file     Forced sexual activity: Not on file   Other Topics Concern    Not on file   Social History Narrative    Not on file      Current Outpatient Medications   Medication Sig    lisinopriL (PRINIVIL, ZESTRIL) 40 mg tablet Take 1 Tab by mouth daily. TAKE 1 TABLET BY MOUTH ONCE DAILY    pravastatin (PRAVACHOL) 20 mg tablet TAKE 1 TABLET BY MOUTH EVERY DAY    amLODIPine (NORVASC) 10 mg tablet TAKE 1 TABLET BY MOUTH ONCE DAILY    aspirin 81 mg Tab Take  by mouth daily.  cyclobenzaprine (FLEXERIL) 5 mg tablet Take 1 Tab by mouth three (3) times daily as needed for Muscle Spasm(s). No current facility-administered medications for this visit. Review of Symptoms:  11 systems reviewed, negative other than as stated in the HPI    Physical ExamPhysical Exam:    Vitals:    03/10/21 0956   BP: 120/80   Pulse: 76   Resp: 16   SpO2: 98%   Weight: 222 lb 14.4 oz (101.1 kg)   Height: 5' 8\" (1.727 m)     Body mass index is 33.89 kg/m². General PE  Gen:  NAD  Mental Status - Alert. General Appearance - Not in acute distress. HEENT:  PERRL, no carotid bruits or JVD  Chest and Lung Exam   Inspection: Accessory muscles - No use of accessory muscles in breathing.    Auscultation:   Breath sounds: - Normal.   Cardiovascular Inspection: Jugular vein - Bilateral - Inspection Normal.   Palpation/Percussion:   Apical Impulse: - Normal.   Auscultation: Rhythm - Regular. Heart Sounds - S1 WNL and S2 WNL. No S3 or S4. Murmurs & Other Heart Sounds: Auscultation of the heart reveals - No Murmurs. Peripheral Vascular   Upper Extremity: Inspection - Bilateral - No Cyanotic nailbeds or Digital clubbing. Lower Extremity:   Palpation: Edema - Bilateral - No edema. Abdomen:   Soft, non-tender, bowel sounds are active.   Neuro: A&O times 3, CN and motor grossly WNL    Labs:   Lab Results   Component Value Date/Time    Cholesterol, total 172 02/25/2021 02:38 PM    Cholesterol, total 186 01/21/2020 09:31 AM    Cholesterol, total 177 12/13/2018 08:51 AM    Cholesterol, total 186 12/14/2017 08:51 AM    Cholesterol, total 183 11/30/2016 08:54 AM    HDL Cholesterol 53 02/25/2021 02:38 PM    HDL Cholesterol 56 01/21/2020 09:31 AM    HDL Cholesterol 48 12/13/2018 08:51 AM    HDL Cholesterol 57 12/14/2017 08:51 AM    HDL Cholesterol 56 11/30/2016 08:54 AM    LDL, calculated 101 (H) 02/25/2021 02:38 PM    LDL, calculated 102 (H) 01/21/2020 09:31 AM    LDL, calculated 102 (H) 12/13/2018 08:51 AM    LDL, calculated 105 (H) 12/14/2017 08:51 AM    LDL, calculated 107 (H) 11/30/2016 08:54 AM    LDL, calculated 97 11/19/2015 09:30 AM    Triglyceride 98 02/25/2021 02:38 PM    Triglyceride 140 01/21/2020 09:31 AM    Triglyceride 133 12/13/2018 08:51 AM    Triglyceride 121 12/14/2017 08:51 AM    Triglyceride 102 11/30/2016 08:54 AM     No results found for: CPK, CPKX, CPX  Lab Results   Component Value Date/Time    Sodium 141 02/25/2021 02:38 PM    Potassium 4.1 02/25/2021 02:38 PM    Chloride 103 02/25/2021 02:38 PM    CO2 23 02/25/2021 02:38 PM    Anion gap 8 02/02/2010 09:10 AM    Glucose 89 02/25/2021 02:38 PM    BUN 18 02/25/2021 02:38 PM    Creatinine 1.25 02/25/2021 02:38 PM    BUN/Creatinine ratio 14 02/25/2021 02:38 PM    GFR est AA 69 02/25/2021 02:38 PM    GFR est non-AA 60 02/25/2021 02:38 PM    Calcium 9.4 02/25/2021 02:38 PM    Bilirubin, total 0.5 02/25/2021 02:38 PM    Alk. phosphatase 89 02/25/2021 02:38 PM    Protein, total 7.1 02/25/2021 02:38 PM    Albumin 4.7 02/25/2021 02:38 PM    Globulin 3.8 02/02/2010 09:10 AM    A-G Ratio 2.0 02/25/2021 02:38 PM    ALT (SGPT) 21 02/25/2021 02:38 PM       EKG:       Assessment:     Assessment:      1. Abnormal EKG    2. Essential hypertension    3. Mixed hyperlipidemia    4. Precordial pain        Orders Placed This Encounter    AMB POC EKG ROUTINE W/ 12 LEADS, INTER & REP     Order Specific Question:   Reason for Exam:     Answer:   routine        Plan:     Precordial Pain  Abnormal EKG  Will obtain echo and stress test cardiolyte    HTN  Controlled with current therapy    HLD  2/2021  on prava 20 mg  Labs and lipids per PCP       Continue current care and f/u in 6 months. Dave Holm NP       [facility]    3/12/2021         Patient seen, examined by me personally. Plan discussed as detailed. Agree with note as outlined by  NP with modifications as noted. My independent physical exam reveals : Physical Exam   Constitutional: He is oriented to person, place, and time. He appears well-developed and well-nourished. Cardiovascular: Exam reveals friction rub. No murmur heard. Pulmonary/Chest: Effort normal and breath sounds normal.   Musculoskeletal:         General: No edema. Neurological: He is alert and oriented to person, place, and time. Skin: Skin is warm and dry. Psychiatric: He has a normal mood and affect. Nursing note and vitals reviewed. Several months od chest pain, atypical. EKG shows T wave inversions anterolaterally. Will evaluate as noted. No additional findings noted. Agree with plan as outlined above with modifications as noted.      Yony Conteh MD

## 2021-03-29 ENCOUNTER — ANCILLARY PROCEDURE (OUTPATIENT)
Dept: CARDIOLOGY CLINIC | Age: 66
End: 2021-03-29
Payer: MEDICARE

## 2021-03-29 VITALS
SYSTOLIC BLOOD PRESSURE: 120 MMHG | DIASTOLIC BLOOD PRESSURE: 80 MMHG | WEIGHT: 222 LBS | BODY MASS INDEX: 33.65 KG/M2 | HEIGHT: 68 IN

## 2021-03-29 DIAGNOSIS — R07.2 PRECORDIAL PAIN: ICD-10-CM

## 2021-03-29 DIAGNOSIS — I10 ESSENTIAL HYPERTENSION: ICD-10-CM

## 2021-03-29 DIAGNOSIS — R94.31 ABNORMAL EKG: ICD-10-CM

## 2021-03-29 DIAGNOSIS — E78.2 MIXED HYPERLIPIDEMIA: ICD-10-CM

## 2021-03-29 PROCEDURE — 93306 TTE W/DOPPLER COMPLETE: CPT | Performed by: INTERNAL MEDICINE

## 2021-03-30 ENCOUNTER — ANCILLARY PROCEDURE (OUTPATIENT)
Dept: CARDIOLOGY CLINIC | Age: 66
End: 2021-03-30
Payer: MEDICARE

## 2021-03-30 VITALS
HEIGHT: 68 IN | SYSTOLIC BLOOD PRESSURE: 158 MMHG | BODY MASS INDEX: 33.65 KG/M2 | DIASTOLIC BLOOD PRESSURE: 82 MMHG | WEIGHT: 222 LBS

## 2021-03-30 DIAGNOSIS — R07.2 PRECORDIAL PAIN: ICD-10-CM

## 2021-03-30 DIAGNOSIS — E78.2 MIXED HYPERLIPIDEMIA: ICD-10-CM

## 2021-03-30 DIAGNOSIS — I10 ESSENTIAL HYPERTENSION: ICD-10-CM

## 2021-03-30 DIAGNOSIS — R94.31 ABNORMAL EKG: ICD-10-CM

## 2021-03-30 LAB
ECHO AO ASC DIAM: 3.48 CM
ECHO AO ROOT DIAM: 3.71 CM
ECHO AV PEAK GRADIENT: 11.35 MMHG
ECHO AV PEAK VELOCITY: 168.45 CM/S
ECHO LA AREA 4C: 19.01 CM2
ECHO LA MAJOR AXIS: 3.74 CM
ECHO LA MINOR AXIS: 1.75 CM
ECHO LA VOL 2C: 42.92 ML (ref 18–58)
ECHO LA VOL 4C: 51.78 ML (ref 18–58)
ECHO LA VOL BP: 52.94 ML (ref 18–58)
ECHO LA VOL/BSA BIPLANE: 24.78 ML/M2 (ref 16–28)
ECHO LA VOLUME INDEX A2C: 20.09 ML/M2 (ref 16–28)
ECHO LA VOLUME INDEX A4C: 24.23 ML/M2 (ref 16–28)
ECHO LV E' LATERAL VELOCITY: 10.57 CM/S
ECHO LV INTERNAL DIMENSION DIASTOLIC: 4.16 CM (ref 4.2–5.9)
ECHO LV INTERNAL DIMENSION SYSTOLIC: 1.61 CM
ECHO LV ISOVOLUMETRIC RELAXATION TIME (IVRT): 64.7 MS
ECHO LV IVSD: 0.99 CM (ref 0.6–1)
ECHO LV MASS 2D: 134.2 G (ref 88–224)
ECHO LV MASS INDEX 2D: 62.8 G/M2 (ref 49–115)
ECHO LV POSTERIOR WALL DIASTOLIC: 1 CM (ref 0.6–1)
ECHO LVOT PEAK GRADIENT: 7.61 MMHG
ECHO LVOT PEAK VELOCITY: 137.89 CM/S
ECHO MV "A" WAVE DURATION: 131.3 MS
ECHO MV A VELOCITY: 99.77 CM/S
ECHO MV E DECELERATION TIME (DT): 275.95 MS
ECHO MV E VELOCITY: 78.11 CM/S
ECHO MV E/A RATIO: 0.78
ECHO MV E/E' LATERAL: 7.39
ECHO RV TAPSE: 2.04 CM (ref 1.5–2)
LA VOL DISK BP: 49.68 ML (ref 18–58)

## 2021-03-30 PROCEDURE — 93016 CV STRESS TEST SUPVJ ONLY: CPT | Performed by: INTERNAL MEDICINE

## 2021-03-30 PROCEDURE — A9502 TC99M TETROFOSMIN: HCPCS | Performed by: INTERNAL MEDICINE

## 2021-03-30 PROCEDURE — 78452 HT MUSCLE IMAGE SPECT MULT: CPT | Performed by: INTERNAL MEDICINE

## 2021-03-30 PROCEDURE — 93018 CV STRESS TEST I&R ONLY: CPT | Performed by: INTERNAL MEDICINE

## 2021-03-30 RX ADMIN — TETROFOSMIN 10.1 MILLICURIE: 1.38 INJECTION, POWDER, LYOPHILIZED, FOR SOLUTION INTRAVENOUS at 09:00

## 2021-03-30 RX ADMIN — TETROFOSMIN 31.8 MILLICURIE: 1.38 INJECTION, POWDER, LYOPHILIZED, FOR SOLUTION INTRAVENOUS at 10:02

## 2021-03-31 LAB
STRESS BASELINE DIAS BP: 82 MMHG
STRESS BASELINE HR: 80 BPM
STRESS BASELINE SYS BP: 158 MMHG
STRESS ESTIMATED WORKLOAD: 4.6 METS
STRESS EXERCISE DUR MIN: NORMAL MIN:SEC
STRESS PEAK DIAS BP: 96 MMHG
STRESS PEAK SYS BP: 200 MMHG
STRESS PERCENT HR ACHIEVED: 103 %
STRESS POST PEAK HR: 160 BPM
STRESS RATE PRESSURE PRODUCT: NORMAL BPM*MMHG
STRESS ST DEPRESSION: 0 MM
STRESS ST ELEVATION: 0 MM
STRESS TARGET HR: 155 BPM

## 2021-04-08 ENCOUNTER — TELEPHONE (OUTPATIENT)
Dept: CARDIOLOGY CLINIC | Age: 66
End: 2021-04-08

## 2021-04-08 NOTE — TELEPHONE ENCOUNTER
----- Message from Blair Francisco MD sent at 4/7/2021 11:11 PM EDT -----  Echo shows normal heart function, valves.  thx.

## 2021-04-08 NOTE — TELEPHONE ENCOUNTER
----- Message from [id number] Talha Ramirez MD sent at 4/7/2021 11:13 PM EDT -----  Stress test normal, thx.

## 2021-06-23 RX ORDER — PRAVASTATIN SODIUM 20 MG/1
TABLET ORAL
Qty: 90 TABLET | Refills: 1 | Status: SHIPPED | OUTPATIENT
Start: 2021-06-23 | End: 2021-12-26 | Stop reason: SDUPTHER

## 2021-07-09 RX ORDER — AMLODIPINE BESYLATE 10 MG/1
TABLET ORAL
Qty: 90 TABLET | Refills: 3 | Status: SHIPPED | OUTPATIENT
Start: 2021-07-09 | End: 2022-07-06 | Stop reason: SDUPTHER

## 2021-07-09 NOTE — TELEPHONE ENCOUNTER
Future Appointments:  Future Appointments   Date Time Provider Idris Draper   8/20/2021  8:45 AM Chantale Holloway MD [facility] BS AMB   11/5/2021 10:15 AM Micheline Stallworth MD [facility] BS AMB        Last Appointment With Me:  2/25/2021     Requested Prescriptions     Pending Prescriptions Disp Refills    amLODIPine (NORVASC) 10 mg tablet 90 Tablet 3

## 2021-08-24 ENCOUNTER — OFFICE VISIT (OUTPATIENT)
Dept: INTERNAL MEDICINE CLINIC | Age: 66
End: 2021-08-24
Payer: MEDICARE

## 2021-08-24 VITALS
TEMPERATURE: 97.9 F | HEART RATE: 76 BPM | HEIGHT: 68 IN | BODY MASS INDEX: 33.49 KG/M2 | SYSTOLIC BLOOD PRESSURE: 120 MMHG | DIASTOLIC BLOOD PRESSURE: 70 MMHG | OXYGEN SATURATION: 100 % | WEIGHT: 221 LBS | RESPIRATION RATE: 16 BRPM

## 2021-08-24 DIAGNOSIS — G89.29 CHRONIC PAIN OF LEFT KNEE: Primary | ICD-10-CM

## 2021-08-24 DIAGNOSIS — M25.562 CHRONIC PAIN OF LEFT KNEE: Primary | ICD-10-CM

## 2021-08-24 PROCEDURE — G8427 DOCREV CUR MEDS BY ELIG CLIN: HCPCS | Performed by: INTERNAL MEDICINE

## 2021-08-24 PROCEDURE — 1101F PT FALLS ASSESS-DOCD LE1/YR: CPT | Performed by: INTERNAL MEDICINE

## 2021-08-24 PROCEDURE — 99213 OFFICE O/P EST LOW 20 MIN: CPT | Performed by: INTERNAL MEDICINE

## 2021-08-24 PROCEDURE — G8754 DIAS BP LESS 90: HCPCS | Performed by: INTERNAL MEDICINE

## 2021-08-24 PROCEDURE — G0463 HOSPITAL OUTPT CLINIC VISIT: HCPCS | Performed by: INTERNAL MEDICINE

## 2021-08-24 PROCEDURE — G8752 SYS BP LESS 140: HCPCS | Performed by: INTERNAL MEDICINE

## 2021-08-24 PROCEDURE — G8510 SCR DEP NEG, NO PLAN REQD: HCPCS | Performed by: INTERNAL MEDICINE

## 2021-08-24 PROCEDURE — G8536 NO DOC ELDER MAL SCRN: HCPCS | Performed by: INTERNAL MEDICINE

## 2021-08-24 PROCEDURE — 3017F COLORECTAL CA SCREEN DOC REV: CPT | Performed by: INTERNAL MEDICINE

## 2021-08-24 PROCEDURE — G8417 CALC BMI ABV UP PARAM F/U: HCPCS | Performed by: INTERNAL MEDICINE

## 2021-08-24 NOTE — PROGRESS NOTES
HISTORY OF PRESENT ILLNESS  Chandrika Ott is a 77 y.o. male. HPI   F/u HTN HLD  prediabetes  obesity     C/o intermittent left knee pain, sometimes some swelling  Saw Dr Veronica Anaya in the past    Feels well otherwise  Last OV       Due for colon screen-last done 2010 in [address] well overall  Home bp arouind 650-643 systolic per pt  No cp or sob  Weight has been stable    Patient Active Problem List    Diagnosis Date Noted    Obesity (BMI 30.0-34.9) 12/12/2018    Hyperglycemia 07/10/2012    HLD (hyperlipidemia) 07/10/2012    HTN (hypertension) 02/22/2010     Current Outpatient Medications   Medication Sig Dispense Refill    amLODIPine (NORVASC) 10 mg tablet One qd 90 Tablet 3    pravastatin (PRAVACHOL) 20 mg tablet TAKE 1 TABLET BY MOUTH EVERY DAY 90 Tablet 1    lisinopriL (PRINIVIL, ZESTRIL) 40 mg tablet Take 1 Tab by mouth daily. TAKE 1 TABLET BY MOUTH ONCE DAILY 90 Tab 3    aspirin 81 mg Tab Take  by mouth daily.  cyclobenzaprine (FLEXERIL) 5 mg tablet Take 1 Tab by mouth three (3) times daily as needed for Muscle Spasm(s).  30 Tab 0     Allergies   Allergen Reactions    Livalo [Pitavastatin] Myalgia    Pravastatin Myalgia     Patient not allergic      Lab Results   Component Value Date/Time    WBC 8.7 02/25/2021 02:38 PM    HGB 14.1 02/25/2021 02:38 PM    HCT 41.5 02/25/2021 02:38 PM    PLATELET 025 62/19/6835 02:38 PM    MCV 81 02/25/2021 02:38 PM     Lab Results   Component Value Date/Time    Hemoglobin A1c 5.9 (H) 02/25/2021 02:38 PM    Hemoglobin A1c 6.0 (H) 01/21/2020 09:31 AM    Hemoglobin A1c 5.9 (H) 12/13/2018 08:51 AM    Glucose 89 02/25/2021 02:38 PM    LDL, calculated 101 (H) 02/25/2021 02:38 PM    LDL, calculated 102 (H) 01/21/2020 09:31 AM    Creatinine 1.25 02/25/2021 02:38 PM      Lab Results   Component Value Date/Time    Cholesterol, total 172 02/25/2021 02:38 PM    HDL Cholesterol 53 02/25/2021 02:38 PM    LDL, calculated 101 (H) 02/25/2021 02:38 PM    LDL, calculated 102 (H) 01/21/2020 09:31 AM    Triglyceride 98 02/25/2021 02:38 PM     Lab Results   Component Value Date/Time    GFR est non-AA 60 02/25/2021 02:38 PM    GFR est AA 69 02/25/2021 02:38 PM    Creatinine 1.25 02/25/2021 02:38 PM    BUN 18 02/25/2021 02:38 PM    Sodium 141 02/25/2021 02:38 PM    Potassium 4.1 02/25/2021 02:38 PM    Chloride 103 02/25/2021 02:38 PM    CO2 23 02/25/2021 02:38 PM        ROS    Physical Exam  Vitals and nursing note reviewed. Constitutional:       General: He is not in acute distress. Appearance: He is well-developed. Comments: Appears stated age   HENT:      Head: Normocephalic. Cardiovascular:      Rate and Rhythm: Normal rate and regular rhythm. Heart sounds: Normal heart sounds. Pulmonary:      Effort: Pulmonary effort is normal.      Breath sounds: Normal breath sounds. Abdominal:      Palpations: Abdomen is soft. Musculoskeletal:      Comments: Normal left knee ROM, no effusion or TTP   Neurological:      Mental Status: He is alert. ASSESSMENT and PLAN  Diagnoses and all orders for this visit:    1. Chronic pain of left knee  -     REFERRAL TO ORTHOPEDICS    2. [place] screen   Pt will contact GI MD -Dr Abiola Aquino    3. HTN    4. HLD    5. Prediabetes    Follow-up and Dispositions    · Return in about 6 months (around 2/24/2022) for medicare wellnesss---.

## 2021-12-27 RX ORDER — PRAVASTATIN SODIUM 20 MG/1
20 TABLET ORAL DAILY
Qty: 90 TABLET | Refills: 1 | Status: SHIPPED | OUTPATIENT
Start: 2021-12-27 | End: 2022-06-28 | Stop reason: SDUPTHER

## 2022-01-02 RX ORDER — LISINOPRIL 40 MG/1
TABLET ORAL
Qty: 90 TABLET | Refills: 3 | Status: SHIPPED | OUTPATIENT
Start: 2022-01-02 | End: 2022-09-29 | Stop reason: SDUPTHER

## 2022-02-24 ENCOUNTER — OFFICE VISIT (OUTPATIENT)
Dept: INTERNAL MEDICINE CLINIC | Age: 67
End: 2022-02-24
Payer: MEDICARE

## 2022-02-24 VITALS
RESPIRATION RATE: 16 BRPM | SYSTOLIC BLOOD PRESSURE: 136 MMHG | OXYGEN SATURATION: 99 % | BODY MASS INDEX: 34.1 KG/M2 | HEIGHT: 68 IN | TEMPERATURE: 97.2 F | HEART RATE: 78 BPM | DIASTOLIC BLOOD PRESSURE: 80 MMHG | WEIGHT: 225 LBS

## 2022-02-24 DIAGNOSIS — I10 HYPERTENSION, UNSPECIFIED TYPE: Primary | ICD-10-CM

## 2022-02-24 DIAGNOSIS — R73.03 PREDIABETES: ICD-10-CM

## 2022-02-24 DIAGNOSIS — E78.00 PURE HYPERCHOLESTEROLEMIA: ICD-10-CM

## 2022-02-24 DIAGNOSIS — Z12.5 PROSTATE CANCER SCREENING: ICD-10-CM

## 2022-02-24 DIAGNOSIS — Z00.00 MEDICARE ANNUAL WELLNESS VISIT, SUBSEQUENT: ICD-10-CM

## 2022-02-24 DIAGNOSIS — Z12.11 COLON CANCER SCREENING: ICD-10-CM

## 2022-02-24 LAB
ALBUMIN SERPL-MCNC: 3.8 G/DL (ref 3.5–5)
ALBUMIN/GLOB SERPL: 1 {RATIO} (ref 1.1–2.2)
ALP SERPL-CCNC: 98 U/L (ref 45–117)
ALT SERPL-CCNC: 26 U/L (ref 12–78)
ANION GAP SERPL CALC-SCNC: 6 MMOL/L (ref 5–15)
AST SERPL-CCNC: 18 U/L (ref 15–37)
BILIRUB SERPL-MCNC: 0.4 MG/DL (ref 0.2–1)
BUN SERPL-MCNC: 18 MG/DL (ref 6–20)
BUN/CREAT SERPL: 13 (ref 12–20)
CALCIUM SERPL-MCNC: 9.3 MG/DL (ref 8.5–10.1)
CHLORIDE SERPL-SCNC: 107 MMOL/L (ref 97–108)
CHOLEST SERPL-MCNC: 189 MG/DL
CO2 SERPL-SCNC: 26 MMOL/L (ref 21–32)
CREAT SERPL-MCNC: 1.35 MG/DL (ref 0.7–1.3)
ERYTHROCYTE [DISTWIDTH] IN BLOOD BY AUTOMATED COUNT: 16 % (ref 11.5–14.5)
EST. AVERAGE GLUCOSE BLD GHB EST-MCNC: 126 MG/DL
GLOBULIN SER CALC-MCNC: 3.7 G/DL (ref 2–4)
GLUCOSE SERPL-MCNC: 99 MG/DL (ref 65–100)
HBA1C MFR BLD: 6 % (ref 4–5.6)
HCT VFR BLD AUTO: 45 % (ref 36.6–50.3)
HDLC SERPL-MCNC: 56 MG/DL
HDLC SERPL: 3.4 {RATIO} (ref 0–5)
HGB BLD-MCNC: 14.5 G/DL (ref 12.1–17)
LDLC SERPL CALC-MCNC: 107.4 MG/DL (ref 0–100)
MCH RBC QN AUTO: 26.8 PG (ref 26–34)
MCHC RBC AUTO-ENTMCNC: 32.2 G/DL (ref 30–36.5)
MCV RBC AUTO: 83.2 FL (ref 80–99)
NRBC # BLD: 0 K/UL (ref 0–0.01)
NRBC BLD-RTO: 0 PER 100 WBC
PLATELET # BLD AUTO: 238 K/UL (ref 150–400)
PMV BLD AUTO: 10.1 FL (ref 8.9–12.9)
POTASSIUM SERPL-SCNC: 4.4 MMOL/L (ref 3.5–5.1)
PROT SERPL-MCNC: 7.5 G/DL (ref 6.4–8.2)
PSA SERPL-MCNC: 2.5 NG/ML (ref 0.01–4)
RBC # BLD AUTO: 5.41 M/UL (ref 4.1–5.7)
SODIUM SERPL-SCNC: 139 MMOL/L (ref 136–145)
TRIGL SERPL-MCNC: 128 MG/DL (ref ?–150)
VLDLC SERPL CALC-MCNC: 25.6 MG/DL
WBC # BLD AUTO: 8.3 K/UL (ref 4.1–11.1)

## 2022-02-24 PROCEDURE — G0438 PPPS, INITIAL VISIT: HCPCS | Performed by: INTERNAL MEDICINE

## 2022-02-24 PROCEDURE — 99213 OFFICE O/P EST LOW 20 MIN: CPT | Performed by: INTERNAL MEDICINE

## 2022-02-24 PROCEDURE — G8536 NO DOC ELDER MAL SCRN: HCPCS | Performed by: INTERNAL MEDICINE

## 2022-02-24 PROCEDURE — G0463 HOSPITAL OUTPT CLINIC VISIT: HCPCS | Performed by: INTERNAL MEDICINE

## 2022-02-24 PROCEDURE — G8417 CALC BMI ABV UP PARAM F/U: HCPCS | Performed by: INTERNAL MEDICINE

## 2022-02-24 PROCEDURE — G8432 DEP SCR NOT DOC, RNG: HCPCS | Performed by: INTERNAL MEDICINE

## 2022-02-24 PROCEDURE — 1101F PT FALLS ASSESS-DOCD LE1/YR: CPT | Performed by: INTERNAL MEDICINE

## 2022-02-24 PROCEDURE — G8754 DIAS BP LESS 90: HCPCS | Performed by: INTERNAL MEDICINE

## 2022-02-24 PROCEDURE — G8427 DOCREV CUR MEDS BY ELIG CLIN: HCPCS | Performed by: INTERNAL MEDICINE

## 2022-02-24 PROCEDURE — G8752 SYS BP LESS 140: HCPCS | Performed by: INTERNAL MEDICINE

## 2022-02-24 PROCEDURE — 3017F COLORECTAL CA SCREEN DOC REV: CPT | Performed by: INTERNAL MEDICINE

## 2022-02-24 RX ORDER — MELOXICAM 15 MG/1
TABLET ORAL
COMMUNITY
Start: 2021-09-09 | End: 2022-08-22

## 2022-02-24 RX ORDER — DICLOFENAC SODIUM 10 MG/G
2-4 GEL TOPICAL
COMMUNITY
Start: 2021-09-09

## 2022-02-24 NOTE — PROGRESS NOTES
HISTORY OF PRESENT ILLNESS  Troy Lindquist is a 77 y.o. male. HPI     F/u HTN HLD  prediabetes  obesity and medicare wellness-----  Had nl echo and neg NST last year for abnl ekg  Due for colon screen--July 2022 will Dr Anneliese Mix    saw ortho MD for knee OA--had injections--takes mobic and voltaren  Cold weather increases the pain  Request temporary [employer] parking placard handicapped parking    Not on strict diet -weight stable    Last OV    C/o intermittent left knee pain, sometimes some swelling  Saw Dr Alejandro Mackay in the past     Feels well otherwise    Patient Active Problem List    Diagnosis Date Noted    Obesity (BMI 30.0-34.9) 12/12/2018    Hyperglycemia 07/10/2012    HLD (hyperlipidemia) 07/10/2012    HTN (hypertension) 02/22/2010     Current Outpatient Medications   Medication Sig Dispense Refill    lisinopriL (PRINIVIL, ZESTRIL) 40 mg tablet TAKE 1 TABLET BY MOUTH EVERY DAY 90 Tablet 3    pravastatin (PRAVACHOL) 20 mg tablet Take 1 Tablet by mouth daily. 90 Tablet 1    amLODIPine (NORVASC) 10 mg tablet One qd 90 Tablet 3    aspirin 81 mg Tab Take  by mouth daily.        Allergies   Allergen Reactions    Livalo [Pitavastatin] Myalgia    Pravastatin Myalgia     Patient not allergic      Lab Results   Component Value Date/Time    WBC 8.7 02/25/2021 02:38 PM    HGB 14.1 02/25/2021 02:38 PM    HCT 41.5 02/25/2021 02:38 PM    PLATELET 910 02/95/8842 02:38 PM    MCV 81 02/25/2021 02:38 PM     Lab Results   Component Value Date/Time    Hemoglobin A1c 5.9 (H) 02/25/2021 02:38 PM    Hemoglobin A1c 6.0 (H) 01/21/2020 09:31 AM    Hemoglobin A1c 5.9 (H) 12/13/2018 08:51 AM    Glucose 89 02/25/2021 02:38 PM    LDL, calculated 101 (H) 02/25/2021 02:38 PM    LDL, calculated 102 (H) 01/21/2020 09:31 AM    Creatinine 1.25 02/25/2021 02:38 PM      Lab Results   Component Value Date/Time    Cholesterol, total 172 02/25/2021 02:38 PM    HDL Cholesterol 53 02/25/2021 02:38 PM    LDL, calculated 101 (H) 02/25/2021 02:38 PM LDL, calculated 102 (H) 01/21/2020 09:31 AM    Triglyceride 98 02/25/2021 02:38 PM     Lab Results   Component Value Date/Time    GFR est non-AA 60 02/25/2021 02:38 PM    GFR est AA 69 02/25/2021 02:38 PM    Creatinine 1.25 02/25/2021 02:38 PM    BUN 18 02/25/2021 02:38 PM    Sodium 141 02/25/2021 02:38 PM    Potassium 4.1 02/25/2021 02:38 PM    Chloride 103 02/25/2021 02:38 PM    CO2 23 02/25/2021 02:38 PM        ROS    Physical Exam  Vitals and nursing note reviewed. Constitutional:       General: He is not in acute distress. Appearance: Normal appearance. He is well-developed. He is obese. Comments: Appears stated age   HENT:      Head: Normocephalic. Right Ear: Tympanic membrane normal.      Left Ear: Tympanic membrane normal.   Cardiovascular:      Rate and Rhythm: Normal rate and regular rhythm. Heart sounds: Normal heart sounds. Pulmonary:      Effort: Pulmonary effort is normal.      Breath sounds: Normal breath sounds. Abdominal:      Palpations: Abdomen is soft. Neurological:      Mental Status: He is alert. ASSESSMENT and PLAN  Diagnoses and all orders for this visit:    1. Hypertension, unspecified type  -     METABOLIC PANEL, COMPREHENSIVE; Future  -     CBC W/O DIFF; Future    2. Pure hypercholesterolemia  -     LIPID PANEL; Future  -     METABOLIC PANEL, COMPREHENSIVE; Future    3. Prediabetes  -     HEMOGLOBIN A1C WITH EAG; Future    4. Colon cancer screening    5. Prostate cancer screening  -     PSA SCREENING (SCREENING); Future      Follow-up and Dispositions    · Return in about 6 months (around 8/24/2022) for htn HLD. This is the Subsequent Medicare Annual Wellness Exam, performed 12 months or more after the Initial AWV or the last Subsequent AWV    I have reviewed the patient's medical history in detail and updated the computerized patient record.        Assessment/Plan   Education and counseling provided:  Are appropriate based on today's review and evaluation  End-of-Life planning (with patient's consent)  Prostate cancer screening tests (PSA, covered annually)  Colorectal cancer screening tests    1. Hypertension, unspecified type  -     METABOLIC PANEL, COMPREHENSIVE; Future  -     CBC W/O DIFF; Future  2. Pure hypercholesterolemia  -     LIPID PANEL; Future  -     METABOLIC PANEL, COMPREHENSIVE; Future  3. Prediabetes  -     HEMOGLOBIN A1C WITH EAG; Future  4. Colon cancer screening  5. Prostate cancer screening  -     PSA SCREENING (SCREENING); Future       Depression Risk Factor Screening     3 most recent PHQ Screens 2/24/2022   Little interest or pleasure in doing things -   Feeling down, depressed, irritable, or hopeless Not at all   Total Score PHQ 2 -       Alcohol & Drug Abuse Risk Screen    Do you average more than 1 drink per night or more than 7 drinks a week: No    In the past three months have you have had more than 4 drinks containing alcohol on one occasion: No          Functional Ability and Level of Safety    Hearing: Hearing is good. Activities of Daily Living: The home contains: handrails  Patient does total self care      Ambulation: with mild difficulty     Fall Risk:  Fall Risk Assessment, last 12 mths 2/24/2022   Able to walk? Yes   Fall in past 12 months? 0   Do you feel unsteady?  0   Are you worried about falling 0      Abuse Screen:  Patient is not abused       Cognitive Screening    Has your family/caregiver stated any concerns about your memory: no     Cognitive Screening: Normal - Verbal Fluency Test    Health Maintenance Due     Health Maintenance Due   Topic Date Due    Colorectal Cancer Screening Combo  01/10/2020    A1C test (Diabetic or Prediabetic)  02/25/2022    Lipid Screen  02/25/2022       Patient Care Team   Patient Care Team:  Lindsey Poe MD as PCP - General  Lindsey Poe MD as PCP - [facility] Empaneled Provider    History     Patient Active Problem List   Diagnosis Code    HTN (hypertension) I10   Rossy Hyperglycemia R73.9    HLD (hyperlipidemia) E78.5    Obesity (BMI 30.0-34. 9) E66.9     Past Medical History:   Diagnosis Date    Hypertension       No past surgical history on file. Current Outpatient Medications   Medication Sig Dispense Refill    diclofenac (VOLTAREN) 1 % gel Apply 2-4 g to affected area.  meloxicam (MOBIC) 15 mg tablet Take 1/2 to 1 tablet daily with food for pain / inflammation.  lisinopriL (PRINIVIL, ZESTRIL) 40 mg tablet TAKE 1 TABLET BY MOUTH EVERY DAY 90 Tablet 3    pravastatin (PRAVACHOL) 20 mg tablet Take 1 Tablet by mouth daily. 90 Tablet 1    amLODIPine (NORVASC) 10 mg tablet One qd 90 Tablet 3    aspirin 81 mg Tab Take  by mouth daily.        Allergies   Allergen Reactions    Livalo [Pitavastatin] Myalgia    Pravastatin Myalgia     Patient not allergic       Family History   Problem Relation Age of Onset    Cancer Mother         breast    Diabetes Mother     High Cholesterol Mother     Stroke Mother     Stroke Father      Social History     Tobacco Use    Smoking status: Never Smoker    Smokeless tobacco: Never Used   Substance Use Topics    Alcohol use: Yes     Comment: occasional         Blair De La Rosa MD

## 2022-02-24 NOTE — PATIENT INSTRUCTIONS
Office Policies    Phone calls/patient messages:            Please allow up to 24 hours for someone in the office to contact you about your call or message. Be mindful your provider may be out of the office or your message may require further review. We encourage you to use [employer] for your messages as this is a faster, more efficient way to communicate with our office                         Medication Refills:            Prescription medications require 48-72 business hours to process. We encourage you to use [employer] for your refills. For controlled medications: Please allow 72 business hours to process. Certain medications may require you to  a written prescription at our office. NO narcotic/controlled medications will be prescribed after 4pm Monday through Friday or on weekends              Form/Paperwork Completion:            Please note a $25 fee may incur for all paperwork for completed by our providers. We ask that you allow 7-10 business days. Pre-payment is due prior to picking up/faxing the completed form. You may also download your forms to [employer] to have your doctor print off. Medicare Wellness Visit, Male    The best way to live healthy is to have a lifestyle where you eat a well-balanced diet, exercise regularly, limit alcohol use, and quit all forms of tobacco/nicotine, if applicable. Regular preventive services are another way to keep healthy. Preventive services (vaccines, screening tests, monitoring & exams) can help personalize your care plan, which helps you manage your own care. Screening tests can find health problems at the earliest stages, when they are easiest to treat. [facility] follows the current, evidence-based guidelines published by the [facility] [employer] [place] (USPSTF) when recommending preventive services for our patients.  Because we follow these guidelines, sometimes recommendations change over time as research supports it. (For example, a prostate screening blood test is no longer routinely recommended for men with no symptoms). Of course, you and your doctor may decide to screen more often for some diseases, based on your risk and co-morbidities (chronic disease you are already diagnosed with). Preventive services for you include:  - Medicare offers their members a free annual wellness visit, which is time for you and your primary care provider to discuss and plan for your preventive service needs. Take advantage of this benefit every year!  -All adults over age 72 should receive the recommended pneumonia vaccines. Current USPSTF guidelines recommend a series of two vaccines for the best pneumonia protection.   -All adults should have a flu vaccine yearly and tetanus vaccine every 10 years.  -All adults age 48 and older should receive the shingles vaccines (series of two vaccines). -All adults age 38-68 who are overweight should have a diabetes screening test once every three years.   -Other screening tests & preventive services for persons with diabetes include: an eye exam to screen for diabetic retinopathy, a kidney function test, a foot exam, and stricter control over your cholesterol.   -Cardiovascular screening for adults with routine risk involves an electrocardiogram (ECG) at intervals determined by the provider.   -Colorectal cancer screening should be done for adults age 54-65 with no increased risk factors for colorectal cancer. There are a number of acceptable methods of screening for this type of cancer. Each test has its own benefits and drawbacks. Discuss with your provider what is most appropriate for you during your annual wellness visit.  The different tests include: colonoscopy (considered the best screening method), a fecal occult blood test, a fecal DNA test, and sigmoidoscopy.  -All adults born between [facility] should be screened once for Hepatitis C.  -An Abdominal Aortic Aneurysm (AAA) Screening is recommended for men age 73-68 who has ever smoked in their lifetime.      Here is a list of your current Health Maintenance items (your personalized list of preventive services) with a due date:  Health Maintenance Due   Topic Date Due    Colorectal Screening  01/10/2020    Hemoglobin A1C    02/25/2022    Cholesterol Test   02/25/2022

## 2022-03-18 PROBLEM — E66.9 OBESITY (BMI 30.0-34.9): Status: ACTIVE | Noted: 2018-12-12

## 2022-03-18 PROBLEM — E66.811 OBESITY (BMI 30.0-34.9): Status: ACTIVE | Noted: 2018-12-12

## 2022-06-29 RX ORDER — PRAVASTATIN SODIUM 20 MG/1
20 TABLET ORAL DAILY
Qty: 90 TABLET | Refills: 1 | Status: SHIPPED | OUTPATIENT
Start: 2022-06-29 | End: 2022-07-15

## 2022-06-29 RX ORDER — PRAVASTATIN SODIUM 20 MG/1
20 TABLET ORAL DAILY
Qty: 90 TABLET | Refills: 1 | Status: SHIPPED | OUTPATIENT
Start: 2022-06-29

## 2022-06-29 NOTE — TELEPHONE ENCOUNTER
Future Appointments:  Future Appointments   Date Time Provider Idris Bonny   8/22/2022  9:45 AM Marlena Gonzalez MD [facility] BS AMB        Last Appointment With Me:  2/24/2022     Requested Prescriptions     Pending Prescriptions Disp Refills    pravastatin (PRAVACHOL) 20 mg tablet 90 Tablet 1     Sig: Take 1 Tablet by mouth daily.

## 2022-07-06 RX ORDER — AMLODIPINE BESYLATE 10 MG/1
TABLET ORAL
Qty: 90 TABLET | Refills: 3 | Status: SHIPPED | OUTPATIENT
Start: 2022-07-06

## 2022-07-18 ENCOUNTER — ANESTHESIA EVENT (OUTPATIENT)
Dept: ENDOSCOPY | Age: 67
End: 2022-07-18
Payer: MEDICARE

## 2022-07-18 ENCOUNTER — HOSPITAL ENCOUNTER (OUTPATIENT)
Age: 67
Setting detail: OUTPATIENT SURGERY
Discharge: HOME | End: 2022-07-18
Attending: INTERNAL MEDICINE | Admitting: INTERNAL MEDICINE
Payer: MEDICARE

## 2022-07-18 ENCOUNTER — ANESTHESIA (OUTPATIENT)
Dept: ENDOSCOPY | Age: 67
End: 2022-07-18
Payer: MEDICARE

## 2022-07-18 VITALS
DIASTOLIC BLOOD PRESSURE: 83 MMHG | OXYGEN SATURATION: 98 % | HEART RATE: 79 BPM | RESPIRATION RATE: 14 BRPM | HEIGHT: 68 IN | SYSTOLIC BLOOD PRESSURE: 160 MMHG | WEIGHT: 220 LBS | TEMPERATURE: 97.9 F | BODY MASS INDEX: 33.34 KG/M2

## 2022-07-18 PROCEDURE — 74011000250 HC RX REV CODE- 250: Performed by: NURSE ANESTHETIST, CERTIFIED REGISTERED

## 2022-07-18 PROCEDURE — 2709999900 HC NON-CHARGEABLE SUPPLY: Performed by: INTERNAL MEDICINE

## 2022-07-18 PROCEDURE — 76060000031 HC ANESTHESIA FIRST 0.5 HR: Performed by: INTERNAL MEDICINE

## 2022-07-18 PROCEDURE — 74011250636 HC RX REV CODE- 250/636: Performed by: NURSE ANESTHETIST, CERTIFIED REGISTERED

## 2022-07-18 PROCEDURE — 77030013992 HC SNR POLYP ENDOSC BSC -B: Performed by: INTERNAL MEDICINE

## 2022-07-18 PROCEDURE — 76040000019: Performed by: INTERNAL MEDICINE

## 2022-07-18 PROCEDURE — 88305 TISSUE EXAM BY PATHOLOGIST: CPT

## 2022-07-18 PROCEDURE — 74011250636 HC RX REV CODE- 250/636: Performed by: INTERNAL MEDICINE

## 2022-07-18 RX ORDER — EPINEPHRINE 0.1 MG/ML
1 INJECTION INTRACARDIAC; INTRAVENOUS
Status: DISCONTINUED | OUTPATIENT
Start: 2022-07-18 | End: 2022-07-18 | Stop reason: HOSPADM

## 2022-07-18 RX ORDER — PHENYLEPHRINE HCL IN 0.9% NACL 0.4MG/10ML
SYRINGE (ML) INTRAVENOUS AS NEEDED
Status: DISCONTINUED | OUTPATIENT
Start: 2022-07-18 | End: 2022-07-18 | Stop reason: HOSPADM

## 2022-07-18 RX ORDER — FENTANYL CITRATE 50 UG/ML
25 INJECTION, SOLUTION INTRAMUSCULAR; INTRAVENOUS
Status: DISCONTINUED | OUTPATIENT
Start: 2022-07-18 | End: 2022-07-18 | Stop reason: HOSPADM

## 2022-07-18 RX ORDER — LIDOCAINE HYDROCHLORIDE 20 MG/ML
INJECTION, SOLUTION EPIDURAL; INFILTRATION; INTRACAUDAL; PERINEURAL AS NEEDED
Status: DISCONTINUED | OUTPATIENT
Start: 2022-07-18 | End: 2022-07-18 | Stop reason: HOSPADM

## 2022-07-18 RX ORDER — SODIUM CHLORIDE 0.9 % (FLUSH) 0.9 %
5-40 SYRINGE (ML) INJECTION EVERY 8 HOURS
Status: DISCONTINUED | OUTPATIENT
Start: 2022-07-18 | End: 2022-07-18 | Stop reason: HOSPADM

## 2022-07-18 RX ORDER — ATROPINE SULFATE 0.1 MG/ML
0.5 INJECTION INTRAVENOUS
Status: DISCONTINUED | OUTPATIENT
Start: 2022-07-18 | End: 2022-07-18 | Stop reason: HOSPADM

## 2022-07-18 RX ORDER — FLUMAZENIL 0.1 MG/ML
0.2 INJECTION INTRAVENOUS
Status: DISCONTINUED | OUTPATIENT
Start: 2022-07-18 | End: 2022-07-18 | Stop reason: HOSPADM

## 2022-07-18 RX ORDER — NALOXONE HYDROCHLORIDE 0.4 MG/ML
0.4 INJECTION, SOLUTION INTRAMUSCULAR; INTRAVENOUS; SUBCUTANEOUS
Status: DISCONTINUED | OUTPATIENT
Start: 2022-07-18 | End: 2022-07-18 | Stop reason: HOSPADM

## 2022-07-18 RX ORDER — MIDAZOLAM HYDROCHLORIDE 1 MG/ML
.25-5 INJECTION, SOLUTION INTRAMUSCULAR; INTRAVENOUS
Status: DISCONTINUED | OUTPATIENT
Start: 2022-07-18 | End: 2022-07-18 | Stop reason: HOSPADM

## 2022-07-18 RX ORDER — PROPOFOL 10 MG/ML
INJECTION, EMULSION INTRAVENOUS AS NEEDED
Status: DISCONTINUED | OUTPATIENT
Start: 2022-07-18 | End: 2022-07-18 | Stop reason: HOSPADM

## 2022-07-18 RX ORDER — SODIUM CHLORIDE 0.9 % (FLUSH) 0.9 %
5-40 SYRINGE (ML) INJECTION AS NEEDED
Status: DISCONTINUED | OUTPATIENT
Start: 2022-07-18 | End: 2022-07-18 | Stop reason: HOSPADM

## 2022-07-18 RX ORDER — DEXTROMETHORPHAN/PSEUDOEPHED 2.5-7.5/.8
1.2 DROPS ORAL
Status: DISCONTINUED | OUTPATIENT
Start: 2022-07-18 | End: 2022-07-18 | Stop reason: HOSPADM

## 2022-07-18 RX ORDER — SODIUM CHLORIDE 9 MG/ML
75 INJECTION, SOLUTION INTRAVENOUS CONTINUOUS
Status: DISCONTINUED | OUTPATIENT
Start: 2022-07-18 | End: 2022-07-18 | Stop reason: HOSPADM

## 2022-07-18 RX ADMIN — PROPOFOL 50 MG: 10 INJECTION, EMULSION INTRAVENOUS at 07:55

## 2022-07-18 RX ADMIN — PROPOFOL 100 MG: 10 INJECTION, EMULSION INTRAVENOUS at 07:46

## 2022-07-18 RX ADMIN — Medication 40 MCG: at 07:57

## 2022-07-18 RX ADMIN — SODIUM CHLORIDE 75 ML/HR: 9 INJECTION, SOLUTION INTRAVENOUS at 07:22

## 2022-07-18 RX ADMIN — Medication 40 MCG: at 08:00

## 2022-07-18 RX ADMIN — PROPOFOL 50 MG: 10 INJECTION, EMULSION INTRAVENOUS at 07:45

## 2022-07-18 RX ADMIN — PROPOFOL 50 MG: 10 INJECTION, EMULSION INTRAVENOUS at 07:49

## 2022-07-18 RX ADMIN — LIDOCAINE HYDROCHLORIDE 40 MG: 20 INJECTION, SOLUTION EPIDURAL; INFILTRATION; INTRACAUDAL; PERINEURAL at 07:45

## 2022-07-18 NOTE — PROCEDURES
NAME:  Marzena Guevara   :   1955   MRN:   [id number]     Date/Time:  2022 8:06 AM    Colonoscopy Operative Report    Procedure Type:   Colonoscopy with polypectomy (cold snare)     Indications:     Screening colonoscopy  Pre-operative Diagnosis: see indication above  Post-operative Diagnosis:  See findings below  :  Merlin Coop, MD  Referring Provider: Walter Caldera MD    Exam:  Airway: clear, no airway problems anticipated  Heart: RRR, without gallops or rubs  Lungs: clear bilaterally without wheezes, crackles, or rhonchi  Abdomen: soft, nontender, nondistended, bowel sounds present  Mental Status: awake, alert and oriented to person, place and time    Sedation:  MAC anesthesia Propofol 250mg IV  Procedure Details:  After informed consent was obtained with all risks and benefits of procedure explained and preoperative exam completed, the patient was taken to the endoscopy suite and placed in the left lateral decubitus position. Upon sequential sedation as per above, a digital rectal exam was performed demonstrating no hemorrhoids. The Olympus videocolonoscope  was inserted in the rectum and carefully advanced to the cecum, which was identified by the ileocecal valve and appendiceal orifice. The distal terminal ileum was evalauted. The quality of preparation was adequate. The colonoscope was slowly withdrawn with careful evaluation between folds. Retroflexion in the rectum was completed demonstrating no hemorrhoids. Findings:     -Normal terminal ileum  -Scant sigmoid colon diverticulosis  -Three diminutive, benign-appearing 2mm sessile polyps in the sigmoid colon from 35-45cm; removed with cold snare    Specimen Removed:  #1 sigmoid colon polyps  Complications: None. EBL:  None.     Impression:    -Normal terminal ileum  -Scant sigmoid colon diverticulosis  -Three diminutive, benign-appearing 2mm sessile polyps in the sigmoid colon from 35-45cm; removed with cold snare    Recommendations: --Await pathology. , -Repeat colonoscopy in 3 years. High fiber diet. Resume normal medication(s). You will receive a letter about the biopsy results in about 10 days. You may be asked to call your doctor's office for the results. Discharge Disposition:  Home in the company of a  when able to ambulate.     Letha Morales MD

## 2022-07-18 NOTE — ROUTINE PROCESS
Felipe Fairbanks  1955  [id number]    Situation:  Verbal report received from: MIKY Cooper RN  Procedure: Procedure(s):  COLONOSCOPY  ENDOSCOPIC POLYPECTOMY    Background:    Preoperative diagnosis: Screening for colon cancer [Z12.11]  Postoperative diagnosis: diverticulosis, sigmoid colon poltps    :  Dr. Federico Perkins  Assistant(s): Endoscopy Technician-1: Ludmila Alvarez  Endoscopy RN-1: Jens Conde RN; Tiny Rhein    Specimens:   ID Type Source Tests Collected by Time Destination   1 : sigmoid colon polyp Preservative Sigmoid  Oxana Caraballo MD 7/18/2022 0265 Pathology     H. Pylori  no    Assessment:  Intra-procedure medications     Anesthesia gave intra-procedure sedation and medications, see anesthesia flow sheet yes    Intravenous fluids: NS@ KVO     Vital signs stable   yes    Abdominal assessment: round and soft    yes    Recommendation:  Discharge patient per MD order  yes.     Family or Dorie Comment, wife  Permission to share finding with family or friend yes

## 2022-07-18 NOTE — PERIOP NOTES
Endoscopy Case End Note:    0804:  Procedure scope was pre-cleaned, per protocol, at bedside by Fidelina Israel.      0804:  Report received from anesthesia - Rafa Patel. See anesthesia flowsheet for intra-procedure vital signs and events.

## 2022-07-18 NOTE — ANESTHESIA POSTPROCEDURE EVALUATION
Procedure(s):  COLONOSCOPY  ENDOSCOPIC POLYPECTOMY. general, total IV anesthesia    Anesthesia Post Evaluation        Patient location during evaluation: PACU  Note status: Adequate. Level of consciousness: responsive to verbal stimuli and sleepy but conscious  Pain management: satisfactory to patient  Airway patency: patent  Anesthetic complications: no  Cardiovascular status: acceptable  Respiratory status: acceptable  Hydration status: acceptable  Comments: +Post-Anesthesia Evaluation and Assessment    Patient: Justin De Guzman MRN: [id number]  SSN: [id number]   YOB: 1955  Age: 77 y.o. Sex: male      Cardiovascular Function/Vital Signs    BP (!) 160/83   Pulse 79   Temp 36.6 °C (97.9 °F)   Resp 14   Ht 5' 8\" (1.727 m)   Wt 99.8 kg (220 lb)   SpO2 98%   BMI 33.45 kg/m²     Patient is status post Procedure(s):  COLONOSCOPY  ENDOSCOPIC POLYPECTOMY. Nausea/Vomiting: Controlled. Postoperative hydration reviewed and adequate. Pain:  Pain Scale 1: Numeric (0 - 10) (07/18/22 0830)  Pain Intensity 1: 0 (07/18/22 0830)   Managed. Neurological Status: At baseline. Mental Status and Level of Consciousness: Arousable. Pulmonary Status:   O2 Device: None (Room air) (07/18/22 0830)   Adequate oxygenation and airway patent. Complications related to anesthesia: None    Post-anesthesia assessment completed. No concerns. Signed By: Wang Oneil MD    7/18/2022  Post anesthesia nausea and vomiting:  controlled      INITIAL Post-op Vital signs:   Vitals Value Taken Time   /83 07/18/22 0833   Temp 36.6 °C (97.9 °F) 07/18/22 0815   Pulse 85 07/18/22 0832   Resp 20 07/18/22 0832   SpO2 99 % 07/18/22 0832   Vitals shown include unvalidated device data.

## 2022-07-18 NOTE — DISCHARGE INSTRUCTIONS
Chris Pass  [id number]  1955    COLON DISCHARGE INSTRUCTIONS  Discomfort:  Redness at IV site- apply warm compress to area; if redness or soreness persist- contact your physician  There may be a slight amount of blood passed from the rectum  Gaseous discomfort- walking, belching will help relieve any discomfort  You may not operate a vehicle for 12 hours  You may not engage in an occupation involving machinery or appliances for rest of today  You may not drink alcoholic beverages for at least 12 hours  Avoid making any critical decisions for at least 24 hour  DIET:   High fiber diet. - however -  remember your colon is empty and a heavy meal will produce gas. Avoid these foods:  vegetables, fried / greasy foods, carbonated drinks for today  MEDICATION:         ACTIVITY:  You may not resume your normal daily activities until tomorrow AM; it is recommended that you spend the remainder of the day resting -  avoid any strenuous activity. CALL M.D.   ANY SIGN OF:   Increasing pain, nausea, vomiting  Abdominal distension (swelling)  New increased bleeding (oral or rectal)  Fever (chills)  Pain in chest area  Bloody discharge from nose or mouth  Shortness of breath    IMPRESSION:  -Normal terminal ileum  -Scant sigmoid colon diverticulosis  -Three diminutive, benign-appearing 2mm sessile polyps in the sigmoid colon from 35-45cm; removed with cold snare    Follow-up Instructions:   Call Dr. Rosetta Matos for the results of procedure / biopsy in 7-10 days  Telephone # [phone]  Repeat colonoscopy in 3 years    Franklyn Mckoy MD      Patient Education on Sedation / Analgesia Administered for Procedure      For 24 hours after general anesthesia or intravenous analgesia / sedation:  · Have someone responsible help you with your care  · Limit your activities  · Do not drive and operate hazardous machinery  · Do not make important personal, legal or business decisions  · Do not drink alcoholic beverages  · If you have not urinated within 8 hours after discharge, please contact your physician  · Resume your medications unless otherwise instructed    For 24 hours after general anesthesia or intravenous analgesia / sedation  you may experience:  · Drowsiness, dizziness, sleepiness, or confusion  · Difficulty remembering or delayed reaction times  · Difficulty with your balance, especially while walking, move slowly and carefully, do not make sudden position changes  · Difficulty focusing or blurred vision    You may not be aware of slight changes in your behavior and/or your reaction time because of the medication used during and after your procedure.     Report the following to your physician:  · Excessive pain, swelling, redness or odor of or around the surgical area  · Temperature over 100.5  · Nausea and vomiting lasting longer than 4 hours or if unable to take medications  · Any signs of decreased circulation or nerve impairment to extremity: change in color, persistent numbness, tingling, coldness or increase pain  · Any questions or concerns    IF YOU REPORT TO AN EMERGENCY ROOM, DOCTOR'S OFFICE OR HOSPITAL WITHIN 24 HOURS AFTER YOUR PROCEDURE, BRING THIS SHEET AND YOUR AFTER VISIT SUMMARY WITH YOU AND GIVE IT TO THE PHYSICIAN OR NURSE ATTENDING YOU.

## 2022-07-18 NOTE — H&P
Gastroenterology Outpatient History and Physical    Patient: Blake Lebron    Physician: Nguyễn Hinton MD    Chief Complaint: CRC screening  History of Present Illness: 73yo M needing CRC screening. No Gi s/sx. No fam hx CRC or polyps    History:  Past Medical History:   Diagnosis Date    Arthritis     knee    Hypertension     History reviewed. No pertinent surgical history. Social History     Socioeconomic History    Marital status:    Tobacco Use    Smoking status: Never Smoker    Smokeless tobacco: Never Used   Substance and Sexual Activity    Alcohol use: Yes     Comment: occasional    Drug use: Never     Types: OTC, Prescription    Sexual activity: Yes     Partners: Female      Family History   Problem Relation Age of Onset    Cancer Mother         breast    Diabetes Mother     High Cholesterol Mother     Stroke Mother     Stroke Father       Patient Active Problem List   Diagnosis Code    HTN (hypertension) I10    Hyperglycemia R73.9    HLD (hyperlipidemia) E78.5    Obesity (BMI 30.0-34. 9) E66.9       Allergies: Allergies   Allergen Reactions    Livalo [Pitavastatin] Myalgia    Pravastatin Myalgia     Patient not allergic     Medications:   Prior to Admission medications    Medication Sig Start Date End Date Taking? Authorizing Provider   amLODIPine (NORVASC) 10 mg tablet One qd 7/6/22  Yes Camila Baugh MD   pravastatin (PRAVACHOL) 20 mg tablet Take 1 Tablet by mouth daily. 6/29/22  Yes Camila Baugh MD   diclofenac (VOLTAREN) 1 % gel Apply 2-4 g to affected area. 9/9/21  Yes Provider, Historical   lisinopriL (PRINIVIL, ZESTRIL) 40 mg tablet TAKE 1 TABLET BY MOUTH EVERY DAY 1/2/22  Yes Camila Baugh MD   aspirin 81 mg Tab Take  by mouth daily. 2/22/10  Yes Provider, Historical   meloxicam (MOBIC) 15 mg tablet Take 1/2 to 1 tablet daily with food for pain / inflammation.  9/9/21   Provider, Historical     Physical Exam:   Vital Signs: Blood pressure (!) 182/94, pulse 90, temperature 97.8 °F (36.6 °C), resp. rate 14, height 5' 8\" (1.727 m), weight 99.8 kg (220 lb), SpO2 100 %.   General: well developed, well nourished   HEENT: unremarkable   Heart: regular rhythm no mumur    Lungs: clear   Abdominal:  benign   Neurological: unremarkable   Extremities: no edema     Findings/Diagnosis: CRC screening  Plan of Care/Planned Procedure: Colonosocpy with conscious/deep sedation    Signed:  Libertad Talamantes MD 7/18/2022

## 2022-07-18 NOTE — ANESTHESIA PREPROCEDURE EVALUATION
Relevant Problems   CARDIOVASCULAR   (+) HTN (hypertension)       Anesthetic History   No history of anesthetic complications            Review of Systems / Medical History  Patient summary reviewed, nursing notes reviewed and pertinent labs reviewed    Pulmonary  Within defined limits                 Neuro/Psych   Within defined limits           Cardiovascular  Within defined limits  Hypertension              Exercise tolerance: >4 METS     GI/Hepatic/Renal  Within defined limits              Endo/Other  Within defined limits      Arthritis     Other Findings              Physical Exam    Airway  Mallampati: II  TM Distance: 4 - 6 cm  Neck ROM: normal range of motion   Mouth opening: Normal     Cardiovascular  Regular rate and rhythm,  S1 and S2 normal,  no murmur, click, rub, or gallop             Dental  No notable dental hx       Pulmonary  Breath sounds clear to auscultation               Abdominal  GI exam deferred       Other Findings            Anesthetic Plan    ASA: 2  Anesthesia type: general and total IV anesthesia          Induction: Intravenous  Anesthetic plan and risks discussed with: Patient      Propofol MAC

## 2022-08-21 NOTE — PROGRESS NOTES
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 HPI  F/u HTN HLD  prediabetes  obesity  Had recent colonoscopy -3 polyps--repeat in 3 years  Last a1c 6.0   Had left knee OA pain -cortisone shot did not help  No cp or sob  Home bp wnl  Last OV    Had nl echo and neg NST last year for abnl ekg  Due for colon screen--July 2022 will Dr Nona Goodwin     saw ortho MD for knee OA--had injections--takes mobic and voltaren  Cold weather increases the pain  Request temporary [employer] parking placard handicapped parking     Not on strict diet -weight stable         Patient Active Problem List    Diagnosis Date Noted    Obesity (BMI 30.0-34.9) 12/12/2018    Hyperglycemia 07/10/2012    HLD (hyperlipidemia) 07/10/2012    HTN (hypertension) 02/22/2010     Current Outpatient Medications   Medication Sig Dispense Refill    amLODIPine (NORVASC) 10 mg tablet One qd 90 Tablet 3    pravastatin (PRAVACHOL) 20 mg tablet Take 1 Tablet by mouth daily. 90 Tablet 1    diclofenac (VOLTAREN) 1 % gel Apply 2-4 g to affected area. meloxicam (MOBIC) 15 mg tablet Take 1/2 to 1 tablet daily with food for pain / inflammation. lisinopriL (PRINIVIL, ZESTRIL) 40 mg tablet TAKE 1 TABLET BY MOUTH EVERY DAY 90 Tablet 3    aspirin 81 mg Tab Take  by mouth daily.        Allergies   Allergen Reactions    Livalo [Pitavastatin] Myalgia    Pravastatin Myalgia     Patient not allergic      Lab Results   Component Value Date/Time    WBC 8.3 02/24/2022 09:49 AM    HGB 14.5 02/24/2022 09:49 AM    HCT 45.0 02/24/2022 09:49 AM    PLATELET 257 26/81/6225 09:49 AM    MCV 83.2 02/24/2022 09:49 AM     Lab Results   Component Value Date/Time    Hemoglobin A1c 6.0 (H) 02/24/2022 09:49 AM    Hemoglobin A1c 5.9 (H) 02/25/2021 02:38 PM    Hemoglobin A1c 6.0 (H) 01/21/2020 09:31 AM    Glucose 99 02/24/2022 09:49 AM    LDL, calculated 107.4 (H) 02/24/2022 09:49 AM    Creatinine 1.35 (H) 02/24/2022 09:49 AM      Lab Results   Component Value Date/Time Cholesterol, total 189 02/24/2022 09:49 AM    HDL Cholesterol 56 02/24/2022 09:49 AM    LDL, calculated 107.4 (H) 02/24/2022 09:49 AM    Triglyceride 128 02/24/2022 09:49 AM    CHOL/HDL Ratio 3.4 02/24/2022 09:49 AM     Lab Results   Component Value Date/Time    GFR est non-AA 53 (L) 02/24/2022 09:49 AM    GFR est AA >60 02/24/2022 09:49 AM    Creatinine 1.35 (H) 02/24/2022 09:49 AM    BUN 18 02/24/2022 09:49 AM    Sodium 139 02/24/2022 09:49 AM    Potassium 4.4 02/24/2022 09:49 AM    Chloride 107 02/24/2022 09:49 AM    CO2 26 02/24/2022 09:49 AM        ROS    Physical Exam  Vitals and nursing note reviewed. Constitutional:       General: He is not in acute distress. Appearance: Normal appearance. He is well-developed. He is obese. Comments: Appears stated age   HENT:      Head: Normocephalic. Cardiovascular:      Rate and Rhythm: Normal rate and regular rhythm. Heart sounds: Normal heart sounds. Pulmonary:      Effort: Pulmonary effort is normal.      Breath sounds: Normal breath sounds. Abdominal:      Palpations: Abdomen is soft. Neurological:      Mental Status: He is alert. ASSESSMENT and PLAN    ICD-10-CM ICD-9-CM    1. Hypertension, unspecified type  I10 401.9 Mild SBP elevation-pt reports lower home readings-continue medicines and low sodium diet and bp monitoring      2. Stage 3 chronic kidney disease, unspecified whether stage 3a or 3b CKD ([facility])  N18.30 585.3 No nsaids  Labs next OV        3.  Left knee pain, unspecified chronicity  M25.562 719.46 Tylenol prn  [employer] handicapped placard for prn use

## 2022-08-22 ENCOUNTER — OFFICE VISIT (OUTPATIENT)
Dept: INTERNAL MEDICINE CLINIC | Age: 67
End: 2022-08-22
Payer: MEDICARE

## 2022-08-22 VITALS
DIASTOLIC BLOOD PRESSURE: 80 MMHG | TEMPERATURE: 97.3 F | OXYGEN SATURATION: 97 % | HEIGHT: 68 IN | SYSTOLIC BLOOD PRESSURE: 136 MMHG | WEIGHT: 225.4 LBS | BODY MASS INDEX: 34.16 KG/M2 | RESPIRATION RATE: 16 BRPM | HEART RATE: 78 BPM

## 2022-08-22 DIAGNOSIS — M25.562 LEFT KNEE PAIN, UNSPECIFIED CHRONICITY: ICD-10-CM

## 2022-08-22 DIAGNOSIS — I10 HYPERTENSION, UNSPECIFIED TYPE: Primary | ICD-10-CM

## 2022-08-22 DIAGNOSIS — N18.30 STAGE 3 CHRONIC KIDNEY DISEASE, UNSPECIFIED WHETHER STAGE 3A OR 3B CKD (HCC): ICD-10-CM

## 2022-08-22 PROCEDURE — G8432 DEP SCR NOT DOC, RNG: HCPCS | Performed by: INTERNAL MEDICINE

## 2022-08-22 PROCEDURE — G8417 CALC BMI ABV UP PARAM F/U: HCPCS | Performed by: INTERNAL MEDICINE

## 2022-08-22 PROCEDURE — G0463 HOSPITAL OUTPT CLINIC VISIT: HCPCS | Performed by: INTERNAL MEDICINE

## 2022-08-22 PROCEDURE — 3017F COLORECTAL CA SCREEN DOC REV: CPT | Performed by: INTERNAL MEDICINE

## 2022-08-22 PROCEDURE — 1101F PT FALLS ASSESS-DOCD LE1/YR: CPT | Performed by: INTERNAL MEDICINE

## 2022-08-22 PROCEDURE — G8536 NO DOC ELDER MAL SCRN: HCPCS | Performed by: INTERNAL MEDICINE

## 2022-08-22 PROCEDURE — G8427 DOCREV CUR MEDS BY ELIG CLIN: HCPCS | Performed by: INTERNAL MEDICINE

## 2022-08-22 PROCEDURE — 99213 OFFICE O/P EST LOW 20 MIN: CPT | Performed by: INTERNAL MEDICINE

## 2022-08-22 PROCEDURE — G8752 SYS BP LESS 140: HCPCS | Performed by: INTERNAL MEDICINE

## 2022-08-22 PROCEDURE — G8754 DIAS BP LESS 90: HCPCS | Performed by: INTERNAL MEDICINE

## 2022-08-22 NOTE — PROGRESS NOTES
1. \"Have you been to the ER, urgent care clinic since your last visit? Hospitalized since your last visit? \" No    2. \"Have you seen or consulted any other health care providers outside of the [address] [place] [address] since your last visit? \" No     3. For patients aged 39-70: Has the patient had a colonoscopy / FIT/ Cologuard? Yes - Care Gap present.  Most recent result on file

## 2022-09-29 RX ORDER — LISINOPRIL 40 MG/1
40 TABLET ORAL DAILY
Qty: 90 TABLET | Refills: 3 | Status: SHIPPED | OUTPATIENT
Start: 2022-09-29

## 2022-09-29 NOTE — TELEPHONE ENCOUNTER
Future Appointments:  Future Appointments   Date Time Provider Idris Chai   3/1/2023  9:45 AM Unknown MD Nathan [facility] BS AMB        Last Appointment With Me:  8/22/2022     Requested Prescriptions     Pending Prescriptions Disp Refills    lisinopriL (PRINIVIL, ZESTRIL) 40 mg tablet 90 Tablet 3     Sig: Take 1 Tablet by mouth daily.

## 2022-12-27 RX ORDER — PRAVASTATIN SODIUM 20 MG/1
20 TABLET ORAL DAILY
Qty: 90 TABLET | Refills: 1 | Status: SHIPPED | OUTPATIENT
Start: 2022-12-27

## 2022-12-27 NOTE — TELEPHONE ENCOUNTER
PCP: Luis M Ahmadi MD    Last appt: 8/22/2022  Future Appointments   Date Time Provider Idris Draper   3/1/2023  9:45 AM Luis M Ahmadi MD [facility] BS AMB       Requested Prescriptions     Pending Prescriptions Disp Refills    pravastatin (PRAVACHOL) 20 mg tablet 90 Tablet 1     Sig: Take 1 Tablet by mouth daily.

## 2023-03-01 ENCOUNTER — OFFICE VISIT (OUTPATIENT)
Dept: INTERNAL MEDICINE CLINIC | Age: 68
End: 2023-03-01
Payer: MEDICARE

## 2023-03-01 VITALS
OXYGEN SATURATION: 97 % | DIASTOLIC BLOOD PRESSURE: 80 MMHG | WEIGHT: 229.4 LBS | SYSTOLIC BLOOD PRESSURE: 148 MMHG | BODY MASS INDEX: 34.88 KG/M2 | TEMPERATURE: 97.2 F | HEART RATE: 82 BPM | RESPIRATION RATE: 16 BRPM

## 2023-03-01 DIAGNOSIS — E78.5 HYPERLIPIDEMIA, UNSPECIFIED HYPERLIPIDEMIA TYPE: ICD-10-CM

## 2023-03-01 DIAGNOSIS — R73.03 PREDIABETES: Primary | ICD-10-CM

## 2023-03-01 DIAGNOSIS — I10 HYPERTENSION, UNSPECIFIED TYPE: ICD-10-CM

## 2023-03-01 DIAGNOSIS — N18.30 STAGE 3 CHRONIC KIDNEY DISEASE, UNSPECIFIED WHETHER STAGE 3A OR 3B CKD (HCC): ICD-10-CM

## 2023-03-01 DIAGNOSIS — Z12.5 PROSTATE CANCER SCREENING: ICD-10-CM

## 2023-03-01 DIAGNOSIS — Z00.00 MEDICARE ANNUAL WELLNESS VISIT, SUBSEQUENT: ICD-10-CM

## 2023-03-01 PROCEDURE — G8536 NO DOC ELDER MAL SCRN: HCPCS | Performed by: INTERNAL MEDICINE

## 2023-03-01 PROCEDURE — 1101F PT FALLS ASSESS-DOCD LE1/YR: CPT | Performed by: INTERNAL MEDICINE

## 2023-03-01 PROCEDURE — G8417 CALC BMI ABV UP PARAM F/U: HCPCS | Performed by: INTERNAL MEDICINE

## 2023-03-01 PROCEDURE — G8427 DOCREV CUR MEDS BY ELIG CLIN: HCPCS | Performed by: INTERNAL MEDICINE

## 2023-03-01 PROCEDURE — G0439 PPPS, SUBSEQ VISIT: HCPCS | Performed by: INTERNAL MEDICINE

## 2023-03-01 PROCEDURE — 3017F COLORECTAL CA SCREEN DOC REV: CPT | Performed by: INTERNAL MEDICINE

## 2023-03-01 PROCEDURE — G8510 SCR DEP NEG, NO PLAN REQD: HCPCS | Performed by: INTERNAL MEDICINE

## 2023-03-01 PROCEDURE — G0463 HOSPITAL OUTPT CLINIC VISIT: HCPCS | Performed by: INTERNAL MEDICINE

## 2023-03-01 PROCEDURE — 99213 OFFICE O/P EST LOW 20 MIN: CPT | Performed by: INTERNAL MEDICINE

## 2023-03-01 RX ORDER — PRAVASTATIN SODIUM 40 MG/1
40 TABLET ORAL
Qty: 90 TABLET | Refills: 3 | Status: SHIPPED | OUTPATIENT
Start: 2023-03-01

## 2023-03-01 NOTE — PROGRESS NOTES
Reviewed record in preparation for visit and have obtained necessary documentation. Identified pt with two pt identifiers(name and ). Chief Complaint   Patient presents with    Annual Wellness Visit       There were no vitals taken for this visit. Health Maintenance Due   Topic Date Due    Depresssion Screening  2022    COVID-19 Vaccine (4 - Booster for Ashia series) 2022    Cholesterol Test   2023    Annual Well Visit  2023       Med Reconciliation: Completed        Coordination of Care Questionnaire:  :     1. \"Have you been to the ER, urgent care clinic since your last visit? Hospitalized since your last visit? \" No    2. \"Have you seen or consulted any other health care providers outside of the [address] [place] [address] since your last visit? \" No     3. For patients aged 39-70: Has the patient had a colonoscopy / FIT/ Cologuard? Yes - no Care Gap present      If the patient is female:    4. For patients aged 41-77: Has the patient had a mammogram within the past 2 years? NA - based on age or sex      11. For patients aged 21-65: Has the patient had a pap smear?  NA - based on age or sex

## 2023-03-01 NOTE — PATIENT INSTRUCTIONS
Medicare Wellness Visit, Male    The best way to live healthy is to have a lifestyle where you eat a well-balanced diet, exercise regularly, limit alcohol use, and quit all forms of tobacco/nicotine, if applicable. Regular preventive services are another way to keep healthy. Preventive services (vaccines, screening tests, monitoring & exams) can help personalize your care plan, which helps you manage your own care. Screening tests can find health problems at the earliest stages, when they are easiest to treat. [facility] follows the current, evidence-based guidelines published by the [facility] ([facility]) when recommending preventive services for our patients. Because we follow these guidelines, sometimes recommendations change over time as research supports it. (For example, a prostate screening blood test is no longer routinely recommended for men with no symptoms). Of course, you and your doctor may decide to screen more often for some diseases, based on your risk and co-morbidities (chronic disease you are already diagnosed with). Preventive services for you include:  - Medicare offers their members a free annual wellness visit, which is time for you and your primary care provider to discuss and plan for your preventive service needs.  Take advantage of this benefit every year!    -Over the age of 72 should receive the recommended pneumonia vaccines.   -All adults should have a flu vaccine yearly.  -All adults should receive a tetanus vaccine every 10 years.  -Over the age of 48 should receive the shingles vaccines.    -All adults should be screened once for Hepatitis C.  -All adults age 38-68 who are overweight should have a diabetes screening test once every three years.   -Other screening tests & preventive services for persons with diabetes include: an eye exam to screen for diabetic retinopathy, a kidney function test, a foot exam, and stricter control over your cholesterol.   -Cardiovascular screening for adults with routine risk involves an electrocardiogram (ECG) at intervals determined by the provider.     -Colorectal cancer screening should be done for adults age 43-69 with no increased risk factors for colorectal cancer. There are a number of acceptable methods of screening for this type of cancer. Each test has its own benefits and drawbacks. Discuss with your provider what is most appropriate for you during your annual wellness visit. The different tests include: colonoscopy (considered the best screening method), a fecal occult blood test, a fecal DNA test, and sigmoidoscopy.    -Lung cancer screening is recommended annually with a low dose CT scan for adults between age 54 and 68, who have smoked at least 30 pack years (equivalent of 1 pack per day for 30 days), and who is a current smoker or quit less than 15 years ago. -An Abdominal Aortic Aneurysm (AAA) Screening is recommended for men age 73-68 who has ever smoked in their lifetime.      Here is a list of your current Health Maintenance items (your personalized list of preventive services) with a due date:  Health Maintenance Due   Topic Date Due    Depresssion Screening  08/24/2022    COVID-19 Vaccine (4 - Booster for Ashia series) 11/14/2022    Cholesterol Test   02/24/2023

## 2023-03-01 NOTE — PROGRESS NOTES
HISTORY OF PRESENT ILLNESS  Stefania Carrillo is a 79 y.o. male. HPI  F/u HTN HLD  prediabetes  obesity and medicare wellness-  Home BP around 130/80s usually  Last LDL  107 on pravastatin 20 mg qd  Weigh tup a few lbs after vacation--returned from [place]  No cp sob    Walks for exercise  Nonsmoker, rare eoth  Last OV  Had recent colonoscopy -3 polyps--repeat in 3 years  Last a1c 6.0   Had left knee OA pain -cortisone shot did not help  No cp or sob  Home bp wnl    Patient Active Problem List    Diagnosis Date Noted    Chronic renal disease, stage III 08/22/2022    Obesity (BMI 30.0-34.9) 12/12/2018    Hyperglycemia 07/10/2012    HLD (hyperlipidemia) 07/10/2012    HTN (hypertension) 02/22/2010     Current Outpatient Medications   Medication Sig Dispense Refill    pravastatin (PRAVACHOL) 20 mg tablet Take 1 Tablet by mouth daily. 90 Tablet 1    lisinopriL (PRINIVIL, ZESTRIL) 40 mg tablet Take 1 Tablet by mouth daily. 90 Tablet 3    amLODIPine (NORVASC) 10 mg tablet One qd 90 Tablet 3    diclofenac (VOLTAREN) 1 % gel Apply 2-4 g to affected area. aspirin 81 mg Tab Take  by mouth daily.        Allergies   Allergen Reactions    Livalo [Pitavastatin] Myalgia    Pravastatin Myalgia     Patient not allergic      Lab Results   Component Value Date/Time    WBC 8.3 02/24/2022 09:49 AM    HGB 14.5 02/24/2022 09:49 AM    HCT 45.0 02/24/2022 09:49 AM    PLATELET 892 92/74/0681 09:49 AM    MCV 83.2 02/24/2022 09:49 AM     Lab Results   Component Value Date/Time    Hemoglobin A1c 6.0 (H) 02/24/2022 09:49 AM    Hemoglobin A1c 5.9 (H) 02/25/2021 02:38 PM    Hemoglobin A1c 6.0 (H) 01/21/2020 09:31 AM    Glucose 99 02/24/2022 09:49 AM    LDL, calculated 107.4 (H) 02/24/2022 09:49 AM    Creatinine 1.35 (H) 02/24/2022 09:49 AM      Lab Results   Component Value Date/Time    Cholesterol, total 189 02/24/2022 09:49 AM    HDL Cholesterol 56 02/24/2022 09:49 AM    LDL, calculated 107.4 (H) 02/24/2022 09:49 AM    Triglyceride 128 02/24/2022 09:49 AM    CHOL/HDL Ratio 3.4 02/24/2022 09:49 AM     Lab Results   Component Value Date/Time    GFR est non-AA 53 (L) 02/24/2022 09:49 AM    GFR est AA >60 02/24/2022 09:49 AM    Creatinine 1.35 (H) 02/24/2022 09:49 AM    BUN 18 02/24/2022 09:49 AM    Sodium 139 02/24/2022 09:49 AM    Potassium 4.4 02/24/2022 09:49 AM    Chloride 107 02/24/2022 09:49 AM    CO2 26 02/24/2022 09:49 AM     Lab Results   Component Value Date/Time    TSH 2.020 02/25/2021 02:38 PM      Lab Results   Component Value Date/Time    Glucose 99 02/24/2022 09:49 AM         ROS    Physical Exam  Vitals and nursing note reviewed. Constitutional:       General: He is not in acute distress. Appearance: Normal appearance. He is well-developed. He is obese. HENT:      Head: Normocephalic and atraumatic. Right Ear: Tympanic membrane normal.      Left Ear: Tympanic membrane normal.      Nose: Nose normal.      Mouth/Throat:      Mouth: Mucous membranes are moist.   Eyes:      Pupils: Pupils are equal, round, and reactive to light. Neck:      Vascular: No carotid bruit. Cardiovascular:      Rate and Rhythm: Normal rate and regular rhythm. Pulses: Normal pulses. Heart sounds: Normal heart sounds. No murmur heard. No friction rub. No gallop. Pulmonary:      Effort: Pulmonary effort is normal.      Breath sounds: Normal breath sounds. Abdominal:      General: Bowel sounds are normal.      Palpations: Abdomen is soft. Musculoskeletal:      Cervical back: Neck supple. Right lower leg: No edema. Left lower leg: No edema. Lymphadenopathy:      Cervical: No cervical adenopathy. Skin:     General: Skin is warm. Neurological:      General: No focal deficit present. Mental Status: He is alert. Psychiatric:         Mood and Affect: Mood normal.         Thought Content: Thought content normal.         Judgment: Judgment normal.     ASSESSMENT and PLAN    ICD-10-CM ICD-9-CM    1.  Prediabetes A05.45 531.02 METABOLIC PANEL, COMPREHENSIVE      HEMOGLOBIN A1C WITH EAG  Weight reduction recommended      2. Stage 3 chronic kidney disease, unspecified whether stage 3a or 3b CKD (HCC)  N18.30 585.3       3. Hypertension, unspecified type  S96 344.1 METABOLIC PANEL, COMPREHENSIVE      CBC W/O DIFF  Lower home readings  Continue bp monitoring and low sodium diet      4. Hyperlipidemia, unspecified hyperlipidemia type  E78.5 272.4 LIPID PANEL  Increase pravastatin 40 mg every day-pt will lket me know if myalgias occur      METABOLIC PANEL, COMPREHENSIVE      5. Prostate cancer screening  Z12.5 V76.44 PSA SCREENING (SCREENING)      This is the Subsequent Medicare Annual Wellness Exam, performed 12 months or more after the Initial AWV or the last Subsequent AWV    I have reviewed the patient's medical history in detail and updated the computerized patient record. Assessment/Plan   Education and counseling provided:  Are appropriate based on today's review and evaluation  End-of-Life planning (with patient's consent)  Prostate cancer screening tests (PSA, covered annually)    1. Prediabetes  2. Stage 3 chronic kidney disease, unspecified whether stage 3a or 3b CKD ([facility] [address]  3. Hypertension, unspecified type  4. Hyperlipidemia, unspecified hyperlipidemia type  5. Prostate cancer screening       Depression Risk Factor Screening     3 most recent PHQ Screens 3/1/2023   Little interest or pleasure in doing things Not at all   Feeling down, depressed, irritable, or hopeless Not at all   Total Score PHQ 2 0       Alcohol & Drug Abuse Risk Screen    Do you average more than 1 drink per night or more than 7 drinks a week: No    In the past three months have you have had more than 4 drinks containing alcohol on one occasion: No          Functional Ability and Level of Safety    Hearing: Hearing is good. Activities of Daily Living: The home contains: no safety equipment.   Patient does total self care      Ambulation: with no difficulty     Fall Risk:  Fall Risk Assessment, last 12 mths 3/1/2023   Able to walk? Yes   Fall in past 12 months? 0   Do you feel unsteady? 0   Are you worried about falling 0      Abuse Screen:  Patient is not abused       Cognitive Screening    Has your family/caregiver stated any concerns about your memory: no     Cognitive Screening: Normal - serial 3    Health Maintenance Due     Health Maintenance Due   Topic Date Due    Depression Screen  08/24/2022    COVID-19 Vaccine (4 - Booster for Ashia series) 11/14/2022    Lipid Screen  02/24/2023    Medicare Yearly Exam  02/25/2023       Patient Care Team   Patient Care Team:  Felipe Sales MD as PCP - Dane Mays MD as PCP - [facility]    History     Patient Active Problem List   Diagnosis Code    HTN (hypertension) I10    Hyperglycemia R73.9    HLD (hyperlipidemia) E78.5    Obesity (BMI 30.0-34. 9) E66.9    Chronic renal disease, stage III N18.30     Past Medical History:   Diagnosis Date    Arthritis     knee    Hypertension       Past Surgical History:   Procedure Laterality Date    COLONOSCOPY N/A 7/18/2022    COLONOSCOPY performed by Shelby Leon MD at [facility] ENDOSCOPY    COLONOSCOPY,ISABELLA HEATON  7/18/2022          Current Outpatient Medications   Medication Sig Dispense Refill    pravastatin (PRAVACHOL) 20 mg tablet Take 1 Tablet by mouth daily. 90 Tablet 1    lisinopriL (PRINIVIL, ZESTRIL) 40 mg tablet Take 1 Tablet by mouth daily. 90 Tablet 3    amLODIPine (NORVASC) 10 mg tablet One qd 90 Tablet 3    diclofenac (VOLTAREN) 1 % gel Apply 2-4 g to affected area. aspirin 81 mg Tab Take  by mouth daily.        Allergies   Allergen Reactions    Livalo [Pitavastatin] Myalgia    Pravastatin Myalgia     Patient not allergic       Family History   Problem Relation Age of Onset    Cancer Mother         breast    Diabetes Mother     High Cholesterol Mother     Stroke Mother     Stroke Father      Social History     Tobacco Use Smoking status: Never    Smokeless tobacco: Never   Substance Use Topics    Alcohol use: Yes     Comment: occasional         Elma Aldridge MD

## 2023-03-02 LAB
ALBUMIN SERPL-MCNC: 4.6 G/DL (ref 3.8–4.8)
ALBUMIN/GLOB SERPL: 1.7 {RATIO} (ref 1.2–2.2)
ALP SERPL-CCNC: 105 IU/L (ref 44–121)
ALT SERPL-CCNC: 18 IU/L (ref 0–44)
AST SERPL-CCNC: 19 IU/L (ref 0–40)
BILIRUB SERPL-MCNC: 0.4 MG/DL (ref 0–1.2)
BUN SERPL-MCNC: 15 MG/DL (ref 8–27)
BUN/CREAT SERPL: 12 (ref 10–24)
CALCIUM SERPL-MCNC: 9.6 MG/DL (ref 8.6–10.2)
CHLORIDE SERPL-SCNC: 104 MMOL/L (ref 96–106)
CHOLEST SERPL-MCNC: 195 MG/DL (ref 100–199)
CO2 SERPL-SCNC: 23 MMOL/L (ref 20–29)
CREAT SERPL-MCNC: 1.3 MG/DL (ref 0.76–1.27)
EGFRCR SERPLBLD CKD-EPI 2021: 60 ML/MIN/1.73
ERYTHROCYTE [DISTWIDTH] IN BLOOD BY AUTOMATED COUNT: 16.8 % (ref 11.6–15.4)
EST. AVERAGE GLUCOSE BLD GHB EST-MCNC: 131 MG/DL
GLOBULIN SER CALC-MCNC: 2.7 G/DL (ref 1.5–4.5)
GLUCOSE SERPL-MCNC: 98 MG/DL (ref 70–99)
HBA1C MFR BLD: 6.2 % (ref 4.8–5.6)
HCT VFR BLD AUTO: 45.5 % (ref 37.5–51)
HDLC SERPL-MCNC: 57 MG/DL
HGB BLD-MCNC: 15.1 G/DL (ref 13–17.7)
LDLC SERPL CALC-MCNC: 117 MG/DL (ref 0–99)
MCH RBC QN AUTO: 26.9 PG (ref 26.6–33)
MCHC RBC AUTO-ENTMCNC: 33.2 G/DL (ref 31.5–35.7)
MCV RBC AUTO: 81 FL (ref 79–97)
PLATELET # BLD AUTO: 207 X10E3/UL (ref 150–450)
POTASSIUM SERPL-SCNC: 4.3 MMOL/L (ref 3.5–5.2)
PROT SERPL-MCNC: 7.3 G/DL (ref 6–8.5)
PSA SERPL-MCNC: 2.8 NG/ML (ref 0–4)
RBC # BLD AUTO: 5.62 X10E6/UL (ref 4.14–5.8)
SODIUM SERPL-SCNC: 145 MMOL/L (ref 134–144)
TRIGL SERPL-MCNC: 118 MG/DL (ref 0–149)
VLDLC SERPL CALC-MCNC: 21 MG/DL (ref 5–40)
WBC # BLD AUTO: 9.3 X10E3/UL (ref 3.4–10.8)

## 2023-07-03 ENCOUNTER — PATIENT MESSAGE (OUTPATIENT)
Age: 68
End: 2023-07-03

## 2023-07-03 RX ORDER — AMLODIPINE BESYLATE 10 MG/1
TABLET ORAL
Qty: 30 TABLET | Refills: 5 | Status: SHIPPED | OUTPATIENT
Start: 2023-07-03

## 2023-07-03 NOTE — TELEPHONE ENCOUNTER
From: Brandi Junior  To: Dr. Hunt Carry: 7/3/2023 7:00 AM EDT  Subject: Refill Request    Dr. Will Castro, I need to refill my Amlodipine Besylate 10MG. Noam sent request on 29 June.

## 2023-09-03 SDOH — ECONOMIC STABILITY: INCOME INSECURITY: HOW HARD IS IT FOR YOU TO PAY FOR THE VERY BASICS LIKE FOOD, HOUSING, MEDICAL CARE, AND HEATING?: NOT HARD AT ALL

## 2023-09-03 SDOH — ECONOMIC STABILITY: FOOD INSECURITY: WITHIN THE PAST 12 MONTHS, YOU WORRIED THAT YOUR FOOD WOULD RUN OUT BEFORE YOU GOT MONEY TO BUY MORE.: NEVER TRUE

## 2023-09-03 SDOH — ECONOMIC STABILITY: TRANSPORTATION INSECURITY
IN THE PAST 12 MONTHS, HAS LACK OF TRANSPORTATION KEPT YOU FROM MEETINGS, WORK, OR FROM GETTING THINGS NEEDED FOR DAILY LIVING?: NO

## 2023-09-03 SDOH — ECONOMIC STABILITY: FOOD INSECURITY: WITHIN THE PAST 12 MONTHS, THE FOOD YOU BOUGHT JUST DIDN'T LAST AND YOU DIDN'T HAVE MONEY TO GET MORE.: NEVER TRUE

## 2023-09-03 SDOH — ECONOMIC STABILITY: HOUSING INSECURITY
IN THE LAST 12 MONTHS, WAS THERE A TIME WHEN YOU DID NOT HAVE A STEADY PLACE TO SLEEP OR SLEPT IN A SHELTER (INCLUDING NOW)?: NO

## 2023-09-04 NOTE — PROGRESS NOTES
HISTORY OF PRESENT ILLNESS   Ivet Doe   is a 76 y.o.  male. F/u HTN HLD  prediabetes  obesity  ckd 3  Home  /82 today  Weight fairly stable  Last cr 1.30  pravastatin increased to 40 mg qd last OV    Takes alleve once per week for knee pain   Feels ok   Walks for exericse. Mows grass    Last OV  Home BP around 130/80s usually  Last LDL  107 on pravastatin 20 mg qd  Weigh tup a few lbs after vacation--returned from [facility]  No cp sob    Walks for exercise  Nonsmoker, rare [facility]  Patient Active Problem List    Diagnosis Date Noted    Chronic renal disease, stage III ([address]) 08/22/2022    Obesity (BMI 30.0-34.9) 12/12/2018    Hyperglycemia 07/10/2012    HLD (hyperlipidemia) 07/10/2012    HTN (hypertension) 02/22/2010     Current Outpatient Medications   Medication Sig Dispense Refill    amLODIPine (NORVASC) 10 MG tablet One qd 30 tablet 5    diclofenac sodium (VOLTAREN) 1 % GEL Apply 2-4 g topically      lisinopril (PRINIVIL;ZESTRIL) 40 MG tablet Take 40 mg by mouth daily      pravastatin (PRAVACHOL) 20 MG tablet Take 20 mg by mouth daily      pravastatin (PRAVACHOL) 40 MG tablet Take 1 tablet by mouth nightly       No current facility-administered medications for this visit.      Allergies   Allergen Reactions    Pitavastatin Myalgia    Pravastatin Myalgia     Patient not allergic        BMP:   Lab Results   Component Value Date/Time     03/01/2023 10:48 AM    K 4.3 03/01/2023 10:48 AM     03/01/2023 10:48 AM    CO2 23 03/01/2023 10:48 AM    BUN 15 03/01/2023 10:48 AM    CREATININE 1.30 03/01/2023 10:48 AM    GLUCOSE 98 03/01/2023 10:48 AM    CALCIUM 9.6 03/01/2023 10:48 AM      CBC:   Lab Results   Component Value Date/Time    WBC 9.3 03/01/2023 10:48 AM    RBC 5.62 03/01/2023 10:48 AM    HGB 15.1 03/01/2023 10:48 AM    HCT 45.5 03/01/2023 10:48 AM    MCV 81 03/01/2023 10:48 AM    MCH 26.9 03/01/2023 10:48 AM    MCHC 33.2 03/01/2023 10:48 AM    RDW 16.8 03/01/2023 10:48 AM

## 2023-09-06 ENCOUNTER — OFFICE VISIT (OUTPATIENT)
Age: 68
End: 2023-09-06
Payer: MEDICARE

## 2023-09-06 VITALS
OXYGEN SATURATION: 96 % | SYSTOLIC BLOOD PRESSURE: 132 MMHG | DIASTOLIC BLOOD PRESSURE: 80 MMHG | WEIGHT: 222.8 LBS | HEIGHT: 68 IN | RESPIRATION RATE: 16 BRPM | TEMPERATURE: 97.2 F | BODY MASS INDEX: 33.77 KG/M2 | HEART RATE: 84 BPM

## 2023-09-06 DIAGNOSIS — I10 HYPERTENSION, UNSPECIFIED TYPE: ICD-10-CM

## 2023-09-06 DIAGNOSIS — E66.9 OBESITY (BMI 30.0-34.9): ICD-10-CM

## 2023-09-06 DIAGNOSIS — R73.03 PREDIABETES: Primary | ICD-10-CM

## 2023-09-06 DIAGNOSIS — N18.30 STAGE 3 CHRONIC KIDNEY DISEASE, UNSPECIFIED WHETHER STAGE 3A OR 3B CKD (HCC): ICD-10-CM

## 2023-09-06 DIAGNOSIS — E78.00 PURE HYPERCHOLESTEROLEMIA: ICD-10-CM

## 2023-09-06 LAB
ALBUMIN SERPL-MCNC: 3.8 G/DL (ref 3.5–5)
ALBUMIN/GLOB SERPL: 1.1 (ref 1.1–2.2)
ALP SERPL-CCNC: 101 U/L (ref 45–117)
ALT SERPL-CCNC: 32 U/L (ref 12–78)
ANION GAP SERPL CALC-SCNC: 2 MMOL/L (ref 5–15)
AST SERPL-CCNC: 17 U/L (ref 15–37)
BILIRUB SERPL-MCNC: 0.4 MG/DL (ref 0.2–1)
BUN SERPL-MCNC: 19 MG/DL (ref 6–20)
BUN/CREAT SERPL: 15 (ref 12–20)
CALCIUM SERPL-MCNC: 9.1 MG/DL (ref 8.5–10.1)
CHLORIDE SERPL-SCNC: 111 MMOL/L (ref 97–108)
CHOLEST SERPL-MCNC: 161 MG/DL
CO2 SERPL-SCNC: 29 MMOL/L (ref 21–32)
CREAT SERPL-MCNC: 1.23 MG/DL (ref 0.7–1.3)
GLOBULIN SER CALC-MCNC: 3.5 G/DL (ref 2–4)
GLUCOSE SERPL-MCNC: 101 MG/DL (ref 65–100)
HDLC SERPL-MCNC: 56 MG/DL
HDLC SERPL: 2.9 (ref 0–5)
LDLC SERPL CALC-MCNC: 85.2 MG/DL (ref 0–100)
POTASSIUM SERPL-SCNC: 4.2 MMOL/L (ref 3.5–5.1)
PROT SERPL-MCNC: 7.3 G/DL (ref 6.4–8.2)
SODIUM SERPL-SCNC: 142 MMOL/L (ref 136–145)
TRIGL SERPL-MCNC: 99 MG/DL
VLDLC SERPL CALC-MCNC: 19.8 MG/DL

## 2023-09-06 PROCEDURE — 99213 OFFICE O/P EST LOW 20 MIN: CPT | Performed by: INTERNAL MEDICINE

## 2023-09-06 PROCEDURE — 3079F DIAST BP 80-89 MM HG: CPT | Performed by: INTERNAL MEDICINE

## 2023-09-06 PROCEDURE — 4004F PT TOBACCO SCREEN RCVD TLK: CPT | Performed by: INTERNAL MEDICINE

## 2023-09-06 PROCEDURE — G8427 DOCREV CUR MEDS BY ELIG CLIN: HCPCS | Performed by: INTERNAL MEDICINE

## 2023-09-06 PROCEDURE — 3017F COLORECTAL CA SCREEN DOC REV: CPT | Performed by: INTERNAL MEDICINE

## 2023-09-06 PROCEDURE — 3075F SYST BP GE 130 - 139MM HG: CPT | Performed by: INTERNAL MEDICINE

## 2023-09-06 PROCEDURE — G8417 CALC BMI ABV UP PARAM F/U: HCPCS | Performed by: INTERNAL MEDICINE

## 2023-09-06 PROCEDURE — 1123F ACP DISCUSS/DSCN MKR DOCD: CPT | Performed by: INTERNAL MEDICINE

## 2023-09-06 NOTE — PROGRESS NOTES
1. \"Have you been to the ER, urgent care clinic since your last visit? Hospitalized since your last visit? \" No    2. \"Have you seen or consulted any other health care providers outside of the [address] [place] [address] since your last visit? \" No     3. For patients aged 43-73: Has the patient had a colonoscopy / FIT/ Cologuard?  No

## 2023-09-07 LAB
APPEARANCE UR: CLEAR
BACTERIA #/AREA URNS HPF: NORMAL /[HPF]
BILIRUB UR QL STRIP: NEGATIVE
CASTS URNS QL MICRO: NORMAL /LPF
COLOR UR: YELLOW
EPI CELLS #/AREA URNS HPF: NORMAL /HPF (ref 0–10)
EST. AVERAGE GLUCOSE BLD GHB EST-MCNC: 126 MG/DL
GLUCOSE UR QL STRIP: NEGATIVE
HBA1C MFR BLD: 6 % (ref 4–5.6)
HGB UR QL STRIP: NEGATIVE
KETONES UR QL STRIP: NEGATIVE
LEUKOCYTE ESTERASE UR QL STRIP: NEGATIVE
MICRO URNS: NORMAL
MICRO URNS: NORMAL
NITRITE UR QL STRIP: NEGATIVE
PH UR STRIP: 5.5 [PH] (ref 5–7.5)
PROT UR QL STRIP: NEGATIVE
RBC #/AREA URNS HPF: NORMAL /HPF (ref 0–2)
SP GR UR STRIP: 1.01 (ref 1–1.03)
URINALYSIS REFLEX: NORMAL
UROBILINOGEN UR STRIP-MCNC: 0.2 MG/DL (ref 0.2–1)
WBC #/AREA URNS HPF: NORMAL /HPF (ref 0–5)

## 2023-09-12 ENCOUNTER — HOSPITAL ENCOUNTER (OUTPATIENT)
Facility: HOSPITAL | Age: 68
Discharge: HOME | End: 2023-09-15
Attending: INTERNAL MEDICINE
Payer: MEDICARE

## 2023-09-12 DIAGNOSIS — N18.30 STAGE 3 CHRONIC KIDNEY DISEASE, UNSPECIFIED WHETHER STAGE 3A OR 3B CKD (HCC): ICD-10-CM

## 2023-09-12 PROCEDURE — 76770 US EXAM ABDO BACK WALL COMP: CPT

## 2023-09-14 DIAGNOSIS — R93.422 ABNORMAL ULTRASOUND OF LEFT KIDNEY: Primary | ICD-10-CM

## 2023-12-30 ENCOUNTER — PATIENT MESSAGE (OUTPATIENT)
Age: 68
End: 2023-12-30

## 2024-01-02 RX ORDER — LISINOPRIL 40 MG/1
40 TABLET ORAL DAILY
Qty: 90 TABLET | Refills: 0 | Status: SHIPPED | OUTPATIENT
Start: 2024-01-02

## 2024-01-02 RX ORDER — AMLODIPINE BESYLATE 10 MG/1
TABLET ORAL
Qty: 30 TABLET | Refills: 5 | Status: SHIPPED | OUTPATIENT
Start: 2024-01-02

## 2024-01-02 NOTE — TELEPHONE ENCOUNTER
From: Zakiya Ch  To: Dr. Colby Ward  Sent: 12/30/2023 8:10 AM EST  Subject: Refills Request    I need a refill of my Lisinopril 40mg Tablets and Amlodipine Besylate 10mg Tablets. Noam stated they are pending approval.

## 2024-01-02 NOTE — TELEPHONE ENCOUNTER
PCP: Colby Ward MD    Last appt: 9/6/2023   Future Appointments   Date Time Provider Department Center   3/12/2024  9:30 AM Colby Ward MD [facility] BS AMB       Requested Prescriptions     Pending Prescriptions Disp Refills    lisinopril (PRINIVIL;ZESTRIL) 40 MG tablet 90 tablet 0     Sig: Take 1 tablet by mouth daily

## 2024-02-16 RX ORDER — PRAVASTATIN SODIUM 40 MG
40 TABLET ORAL NIGHTLY
Qty: 90 TABLET | Refills: 1 | Status: SHIPPED | OUTPATIENT
Start: 2024-02-16

## 2024-02-16 NOTE — TELEPHONE ENCOUNTER
----- Message from Zakiya Ch sent at 2/16/2024  7:44 AM EST -----  Regarding: Refill Request  Contact: [phone]  I need to get a refill of my Pravastatin 40mg Tablets.

## 2024-03-04 SDOH — HEALTH STABILITY: PHYSICAL HEALTH: ON AVERAGE, HOW MANY MINUTES DO YOU ENGAGE IN EXERCISE AT THIS LEVEL?: 20 MIN

## 2024-03-04 SDOH — HEALTH STABILITY: PHYSICAL HEALTH: ON AVERAGE, HOW MANY DAYS PER WEEK DO YOU ENGAGE IN MODERATE TO STRENUOUS EXERCISE (LIKE A BRISK WALK)?: 3 DAYS

## 2024-03-04 ASSESSMENT — LIFESTYLE VARIABLES
HOW OFTEN DO YOU HAVE A DRINK CONTAINING ALCOHOL: 3
HOW OFTEN DO YOU HAVE SIX OR MORE DRINKS ON ONE OCCASION: 1
HOW MANY STANDARD DRINKS CONTAINING ALCOHOL DO YOU HAVE ON A TYPICAL DAY: 1
HOW MANY STANDARD DRINKS CONTAINING ALCOHOL DO YOU HAVE ON A TYPICAL DAY: 1 OR 2
HOW OFTEN DO YOU HAVE A DRINK CONTAINING ALCOHOL: 2-4 TIMES A MONTH

## 2024-03-04 ASSESSMENT — PATIENT HEALTH QUESTIONNAIRE - PHQ9
SUM OF ALL RESPONSES TO PHQ QUESTIONS 1-9: 0
SUM OF ALL RESPONSES TO PHQ9 QUESTIONS 1 & 2: 0
2. FEELING DOWN, DEPRESSED OR HOPELESS: 0
SUM OF ALL RESPONSES TO PHQ QUESTIONS 1-9: 0
1. LITTLE INTEREST OR PLEASURE IN DOING THINGS: 0

## 2024-03-10 NOTE — PROGRESS NOTES
HISTORY OF PRESENT ILLNESS   Zakiya Ch   is a 68 y.o.  male.  F/u HTN HLD  prediabetes  obesity  ckd 3 and wellness  Home  /75 today  Last A1c 6.0 LDL85    Saw URO for ? Left hydronephrosis--CT was negatuve per pt and no fu needed.  On volraren gel for knee pain prn  Waslks and [place] grass for exercise    Last OV  Home  /82 today  Weight fairly stable  Last cr 1.30  pravastatin increased to 40 mg qd last OV     Takes alleve once per week for knee pain   Feels ok   Walks for exericse. Mows grass     Patient Active Problem List    Diagnosis Date Noted    Chronic renal disease, stage III (HCC) 08/22/2022    Obesity (BMI 30.0-34.9) 12/12/2018    Hyperglycemia 07/10/2012    HLD (hyperlipidemia) 07/10/2012    HTN (hypertension) 02/22/2010     Current Outpatient Medications   Medication Sig Dispense Refill    pravastatin (PRAVACHOL) 40 MG tablet Take 1 tablet by mouth nightly 90 tablet 1    amLODIPine (NORVASC) 10 MG tablet One qd 30 tablet 5    lisinopril (PRINIVIL;ZESTRIL) 40 MG tablet Take 1 tablet by mouth daily 90 tablet 0    ASPIRIN 81 PO Take by mouth daily      diclofenac sodium (VOLTAREN) 1 % GEL Apply 2-4 g topically       No current facility-administered medications for this visit.     Allergies   Allergen Reactions    Pitavastatin Myalgia    Pravastatin Myalgia     Patient not allergic        BMP:   Lab Results   Component Value Date/Time     09/06/2023 10:17 AM    K 4.2 09/06/2023 10:17 AM     09/06/2023 10:17 AM    CO2 29 09/06/2023 10:17 AM    BUN 19 09/06/2023 10:17 AM    CREATININE 1.23 09/06/2023 10:17 AM    GLUCOSE 101 09/06/2023 10:17 AM    CALCIUM 9.1 09/06/2023 10:17 AM      CBC:   Lab Results   Component Value Date/Time    WBC 9.3 03/01/2023 10:48 AM    RBC 5.62 03/01/2023 10:48 AM    HGB 15.1 03/01/2023 10:48 AM    HCT 45.5 03/01/2023 10:48 AM    MCV 81 03/01/2023 10:48 AM    MCH 26.9 03/01/2023 10:48 AM    MCHC 33.2 03/01/2023 10:48 AM    RDW 16.8 03/01/2023

## 2024-03-12 ENCOUNTER — OFFICE VISIT (OUTPATIENT)
Age: 69
End: 2024-03-12
Payer: MEDICARE

## 2024-03-12 VITALS
HEIGHT: 68 IN | DIASTOLIC BLOOD PRESSURE: 78 MMHG | HEART RATE: 85 BPM | SYSTOLIC BLOOD PRESSURE: 150 MMHG | OXYGEN SATURATION: 98 % | WEIGHT: 226.6 LBS | TEMPERATURE: 97 F | BODY MASS INDEX: 34.34 KG/M2 | RESPIRATION RATE: 18 BRPM

## 2024-03-12 DIAGNOSIS — R73.03 PREDIABETES: ICD-10-CM

## 2024-03-12 DIAGNOSIS — I10 HYPERTENSION, UNSPECIFIED TYPE: Primary | ICD-10-CM

## 2024-03-12 DIAGNOSIS — Z00.00 MEDICARE ANNUAL WELLNESS VISIT, SUBSEQUENT: ICD-10-CM

## 2024-03-12 DIAGNOSIS — E78.5 HYPERLIPIDEMIA, UNSPECIFIED HYPERLIPIDEMIA TYPE: ICD-10-CM

## 2024-03-12 DIAGNOSIS — N18.30 STAGE 3 CHRONIC KIDNEY DISEASE, UNSPECIFIED WHETHER STAGE 3A OR 3B CKD (HCC): ICD-10-CM

## 2024-03-12 DIAGNOSIS — Z12.5 PROSTATE CANCER SCREENING: ICD-10-CM

## 2024-03-12 PROCEDURE — 1123F ACP DISCUSS/DSCN MKR DOCD: CPT | Performed by: INTERNAL MEDICINE

## 2024-03-12 PROCEDURE — 3077F SYST BP >= 140 MM HG: CPT | Performed by: INTERNAL MEDICINE

## 2024-03-12 PROCEDURE — G0439 PPPS, SUBSEQ VISIT: HCPCS | Performed by: INTERNAL MEDICINE

## 2024-03-12 PROCEDURE — 3078F DIAST BP <80 MM HG: CPT | Performed by: INTERNAL MEDICINE

## 2024-03-12 PROCEDURE — 3017F COLORECTAL CA SCREEN DOC REV: CPT | Performed by: INTERNAL MEDICINE

## 2024-03-12 PROCEDURE — G8427 DOCREV CUR MEDS BY ELIG CLIN: HCPCS | Performed by: INTERNAL MEDICINE

## 2024-03-12 PROCEDURE — 99214 OFFICE O/P EST MOD 30 MIN: CPT | Performed by: INTERNAL MEDICINE

## 2024-03-12 PROCEDURE — G8484 FLU IMMUNIZE NO ADMIN: HCPCS | Performed by: INTERNAL MEDICINE

## 2024-03-12 PROCEDURE — G8417 CALC BMI ABV UP PARAM F/U: HCPCS | Performed by: INTERNAL MEDICINE

## 2024-03-12 PROCEDURE — 4004F PT TOBACCO SCREEN RCVD TLK: CPT | Performed by: INTERNAL MEDICINE

## 2024-03-12 RX ORDER — PRAVASTATIN SODIUM 40 MG
40 TABLET ORAL NIGHTLY
Qty: 90 TABLET | Refills: 3 | Status: SHIPPED | OUTPATIENT
Start: 2024-03-12

## 2024-03-12 RX ORDER — LISINOPRIL 40 MG/1
40 TABLET ORAL DAILY
Qty: 90 TABLET | Refills: 3 | Status: SHIPPED | OUTPATIENT
Start: 2024-03-12

## 2024-03-12 NOTE — PROGRESS NOTES
1. \"Have you been to the ER, urgent care clinic since your last visit?  Hospitalized since your last visit?\" No    2. \"Have you seen or consulted any other health care providers outside of the [facility] since your last visit?\" No     3. For patients aged 45-75: Has the patient had a colonoscopy / FIT/ Cologuard? 2022 [facility]

## 2024-03-12 NOTE — PATIENT INSTRUCTIONS
degeneration, and other eye disorders.  A preventive dental visit is recommended every 6 months.  Try to get at least 150 minutes of exercise per week or 10,000 steps per day on a pedometer .  Order or download the FREE \"Exercise & Physical Activity: Your Everyday Guide\" from [employer]. Call 1-101.249.8216 or search [employer] online.  You need 1090-3883 mg of calcium and 2036-9256 IU of vitamin D per day. It is possible to meet your calcium requirement with diet alone, but a vitamin D supplement is usually necessary to meet this goal.  When exposed to the sun, use a sunscreen that protects against both UVA and UVB radiation with an SPF of 30 or greater. Reapply every 2 to 3 hours or after sweating, drying off with a towel, or swimming.  Always wear a seat belt when traveling in a car. Always wear a helmet when riding a bicycle or motorcycle.

## 2024-03-13 LAB
ANION GAP SERPL CALC-SCNC: 6 MMOL/L (ref 5–15)
BUN SERPL-MCNC: 16 MG/DL (ref 6–20)
BUN/CREAT SERPL: 12 (ref 12–20)
CALCIUM SERPL-MCNC: 9.3 MG/DL (ref 8.5–10.1)
CHLORIDE SERPL-SCNC: 107 MMOL/L (ref 97–108)
CHOLEST SERPL-MCNC: 156 MG/DL
CO2 SERPL-SCNC: 27 MMOL/L (ref 21–32)
CREAT SERPL-MCNC: 1.39 MG/DL (ref 0.7–1.3)
ERYTHROCYTE [DISTWIDTH] IN BLOOD BY AUTOMATED COUNT: 16.4 % (ref 11.5–14.5)
EST. AVERAGE GLUCOSE BLD GHB EST-MCNC: 134 MG/DL
GLUCOSE SERPL-MCNC: 95 MG/DL (ref 65–100)
HBA1C MFR BLD: 6.3 % (ref 4–5.6)
HCT VFR BLD AUTO: 43.3 % (ref 36.6–50.3)
HDLC SERPL-MCNC: 43 MG/DL
HDLC SERPL: 3.6 (ref 0–5)
HGB BLD-MCNC: 14.5 G/DL (ref 12.1–17)
LDLC SERPL CALC-MCNC: 92.6 MG/DL (ref 0–100)
MCH RBC QN AUTO: 26.7 PG (ref 26–34)
MCHC RBC AUTO-ENTMCNC: 33.5 G/DL (ref 30–36.5)
MCV RBC AUTO: 79.7 FL (ref 80–99)
NRBC # BLD: 0 K/UL (ref 0–0.01)
NRBC BLD-RTO: 0 PER 100 WBC
PLATELET # BLD AUTO: 205 K/UL (ref 150–400)
PMV BLD AUTO: 10.7 FL (ref 8.9–12.9)
POTASSIUM SERPL-SCNC: 4 MMOL/L (ref 3.5–5.1)
PSA SERPL-MCNC: 2.7 NG/ML (ref 0.01–4)
RBC # BLD AUTO: 5.43 M/UL (ref 4.1–5.7)
SODIUM SERPL-SCNC: 140 MMOL/L (ref 136–145)
TRIGL SERPL-MCNC: 102 MG/DL
VLDLC SERPL CALC-MCNC: 20.4 MG/DL
WBC # BLD AUTO: 10.8 K/UL (ref 4.1–11.1)

## 2024-06-24 RX ORDER — AMLODIPINE BESYLATE 10 MG/1
TABLET ORAL
Qty: 30 TABLET | Refills: 5 | Status: SHIPPED | OUTPATIENT
Start: 2024-06-24

## 2024-09-10 SDOH — ECONOMIC STABILITY: FOOD INSECURITY: WITHIN THE PAST 12 MONTHS, YOU WORRIED THAT YOUR FOOD WOULD RUN OUT BEFORE YOU GOT MONEY TO BUY MORE.: NEVER TRUE

## 2024-09-10 SDOH — ECONOMIC STABILITY: FOOD INSECURITY: WITHIN THE PAST 12 MONTHS, THE FOOD YOU BOUGHT JUST DIDN'T LAST AND YOU DIDN'T HAVE MONEY TO GET MORE.: NEVER TRUE

## 2024-09-10 SDOH — ECONOMIC STABILITY: INCOME INSECURITY: HOW HARD IS IT FOR YOU TO PAY FOR THE VERY BASICS LIKE FOOD, HOUSING, MEDICAL CARE, AND HEATING?: NOT HARD AT ALL

## 2024-09-13 ENCOUNTER — OFFICE VISIT (OUTPATIENT)
Age: 69
End: 2024-09-13
Payer: MEDICARE

## 2024-09-13 VITALS
WEIGHT: 221.6 LBS | RESPIRATION RATE: 16 BRPM | OXYGEN SATURATION: 97 % | HEIGHT: 68 IN | BODY MASS INDEX: 33.59 KG/M2 | SYSTOLIC BLOOD PRESSURE: 138 MMHG | HEART RATE: 77 BPM | DIASTOLIC BLOOD PRESSURE: 84 MMHG | TEMPERATURE: 97.3 F

## 2024-09-13 DIAGNOSIS — N18.31 STAGE 3A CHRONIC KIDNEY DISEASE (HCC): ICD-10-CM

## 2024-09-13 DIAGNOSIS — R73.03 PREDIABETES: ICD-10-CM

## 2024-09-13 DIAGNOSIS — I10 HYPERTENSION, UNSPECIFIED TYPE: Primary | ICD-10-CM

## 2024-09-13 DIAGNOSIS — E78.5 HYPERLIPIDEMIA, UNSPECIFIED HYPERLIPIDEMIA TYPE: ICD-10-CM

## 2024-09-13 LAB
ANION GAP SERPL CALC-SCNC: 3 MMOL/L (ref 2–12)
BUN SERPL-MCNC: 14 MG/DL (ref 6–20)
BUN/CREAT SERPL: 13 (ref 12–20)
CALCIUM SERPL-MCNC: 9.3 MG/DL (ref 8.5–10.1)
CHLORIDE SERPL-SCNC: 108 MMOL/L (ref 97–108)
CO2 SERPL-SCNC: 29 MMOL/L (ref 21–32)
CREAT SERPL-MCNC: 1.12 MG/DL (ref 0.7–1.3)
EST. AVERAGE GLUCOSE BLD GHB EST-MCNC: 114 MG/DL
GLUCOSE SERPL-MCNC: 97 MG/DL (ref 65–100)
HBA1C MFR BLD: 5.6 % (ref 4–5.6)
POTASSIUM SERPL-SCNC: 3.8 MMOL/L (ref 3.5–5.1)
SODIUM SERPL-SCNC: 140 MMOL/L (ref 136–145)

## 2024-09-13 PROCEDURE — 1036F TOBACCO NON-USER: CPT | Performed by: INTERNAL MEDICINE

## 2024-09-13 PROCEDURE — 99214 OFFICE O/P EST MOD 30 MIN: CPT | Performed by: INTERNAL MEDICINE

## 2024-09-13 PROCEDURE — G8417 CALC BMI ABV UP PARAM F/U: HCPCS | Performed by: INTERNAL MEDICINE

## 2024-09-13 PROCEDURE — 3079F DIAST BP 80-89 MM HG: CPT | Performed by: INTERNAL MEDICINE

## 2024-09-13 PROCEDURE — 3075F SYST BP GE 130 - 139MM HG: CPT | Performed by: INTERNAL MEDICINE

## 2024-09-13 PROCEDURE — G8427 DOCREV CUR MEDS BY ELIG CLIN: HCPCS | Performed by: INTERNAL MEDICINE

## 2024-09-13 PROCEDURE — 3017F COLORECTAL CA SCREEN DOC REV: CPT | Performed by: INTERNAL MEDICINE

## 2024-09-13 PROCEDURE — 1123F ACP DISCUSS/DSCN MKR DOCD: CPT | Performed by: INTERNAL MEDICINE

## 2024-09-13 SDOH — ECONOMIC STABILITY: FOOD INSECURITY: WITHIN THE PAST 12 MONTHS, THE FOOD YOU BOUGHT JUST DIDN'T LAST AND YOU DIDN'T HAVE MONEY TO GET MORE.: NEVER TRUE

## 2024-09-13 SDOH — ECONOMIC STABILITY: INCOME INSECURITY: HOW HARD IS IT FOR YOU TO PAY FOR THE VERY BASICS LIKE FOOD, HOUSING, MEDICAL CARE, AND HEATING?: NOT HARD AT ALL

## 2024-09-13 SDOH — ECONOMIC STABILITY: FOOD INSECURITY: WITHIN THE PAST 12 MONTHS, YOU WORRIED THAT YOUR FOOD WOULD RUN OUT BEFORE YOU GOT MONEY TO BUY MORE.: NEVER TRUE

## 2024-12-19 RX ORDER — AMLODIPINE BESYLATE 10 MG/1
TABLET ORAL
Qty: 30 TABLET | Refills: 5 | Status: SHIPPED | OUTPATIENT
Start: 2024-12-19

## 2024-12-19 RX ORDER — LISINOPRIL 40 MG/1
40 TABLET ORAL DAILY
Qty: 90 TABLET | Refills: 3 | Status: SHIPPED | OUTPATIENT
Start: 2024-12-19

## 2025-03-21 SDOH — HEALTH STABILITY: PHYSICAL HEALTH: ON AVERAGE, HOW MANY MINUTES DO YOU ENGAGE IN EXERCISE AT THIS LEVEL?: 20 MIN

## 2025-03-21 SDOH — HEALTH STABILITY: PHYSICAL HEALTH: ON AVERAGE, HOW MANY DAYS PER WEEK DO YOU ENGAGE IN MODERATE TO STRENUOUS EXERCISE (LIKE A BRISK WALK)?: 2 DAYS

## 2025-03-21 ASSESSMENT — LIFESTYLE VARIABLES
HOW MANY STANDARD DRINKS CONTAINING ALCOHOL DO YOU HAVE ON A TYPICAL DAY: 1 OR 2
HOW OFTEN DO YOU HAVE A DRINK CONTAINING ALCOHOL: 2-4 TIMES A MONTH
HOW OFTEN DO YOU HAVE A DRINK CONTAINING ALCOHOL: 3
HOW MANY STANDARD DRINKS CONTAINING ALCOHOL DO YOU HAVE ON A TYPICAL DAY: 1
HOW OFTEN DO YOU HAVE SIX OR MORE DRINKS ON ONE OCCASION: 1

## 2025-03-21 ASSESSMENT — PATIENT HEALTH QUESTIONNAIRE - PHQ9
SUM OF ALL RESPONSES TO PHQ QUESTIONS 1-9: 0
1. LITTLE INTEREST OR PLEASURE IN DOING THINGS: NOT AT ALL
SUM OF ALL RESPONSES TO PHQ QUESTIONS 1-9: 0
2. FEELING DOWN, DEPRESSED OR HOPELESS: NOT AT ALL

## 2025-03-25 SDOH — ECONOMIC STABILITY: INCOME INSECURITY: IN THE LAST 12 MONTHS, WAS THERE A TIME WHEN YOU WERE NOT ABLE TO PAY THE MORTGAGE OR RENT ON TIME?: NO

## 2025-03-25 SDOH — ECONOMIC STABILITY: FOOD INSECURITY: WITHIN THE PAST 12 MONTHS, THE FOOD YOU BOUGHT JUST DIDN'T LAST AND YOU DIDN'T HAVE MONEY TO GET MORE.: NEVER TRUE

## 2025-03-25 SDOH — ECONOMIC STABILITY: TRANSPORTATION INSECURITY
IN THE PAST 12 MONTHS, HAS THE LACK OF TRANSPORTATION KEPT YOU FROM MEDICAL APPOINTMENTS OR FROM GETTING MEDICATIONS?: NO

## 2025-03-25 SDOH — ECONOMIC STABILITY: FOOD INSECURITY: WITHIN THE PAST 12 MONTHS, YOU WORRIED THAT YOUR FOOD WOULD RUN OUT BEFORE YOU GOT MONEY TO BUY MORE.: NEVER TRUE

## 2025-03-26 PROBLEM — N18.30 CHRONIC RENAL DISEASE, STAGE III (HCC): Status: RESOLVED | Noted: 2022-08-22 | Resolved: 2025-03-26

## 2025-03-27 NOTE — PROGRESS NOTES
HISTORY OF PRESENT ILLNESS   Zakiya hC   is a 69 y.o.  male.  F/u HTN HLD  prediabetes  obesity  ckd 2 and medicare wellness--  Last Cr 1.12 A1c 5.6  Home /85 today    Weight up a few lbs  Active yard work and walking and should be able to lose the weight per pt  No cp or sob  Feels well overall  Nonsmoker, rare etoh    Last OV     Home 's/78 range on average per pt  Yard work , walks for exercise  No cp or sob  Feels well overal  Last OV  Home  /75 today  Last A1c 6.0 LDL85     Saw [facility] for ? Left hydronephrosis--CT was negatuve per pt and no fu needed.  On volraren gel for knee pain prn  Waslks and motarun grass for exercise     Patient Active Problem List    Diagnosis Date Noted    Chronic renal disease, stage III (HCC) 08/22/2022    Obesity (BMI 30.0-34.9) 12/12/2018    Hyperglycemia 07/10/2012    HLD (hyperlipidemia) 07/10/2012    HTN (hypertension) 02/22/2010     Current Outpatient Medications   Medication Sig Dispense Refill    lisinopril (PRINIVIL;ZESTRIL) 40 MG tablet Take 1 tablet by mouth daily 90 tablet 3    amLODIPine (NORVASC) 10 MG tablet One qd 30 tablet 5    pravastatin (PRAVACHOL) 40 MG tablet Take 1 tablet by mouth nightly 90 tablet 3    diclofenac sodium (VOLTAREN) 1 % GEL Apply 2-4 g topically 4 times daily as needed for Pain 100 g 5    ASPIRIN 81 PO Take by mouth daily       No current facility-administered medications for this visit.     Allergies   Allergen Reactions    Pitavastatin Myalgia    Pravastatin Myalgia     Patient not allergic     Social History     Tobacco Use    Smoking status: Never    Smokeless tobacco: Never   Substance Use Topics    Alcohol use: Yes     Alcohol/week: 3.0 standard drinks of alcohol     Types: 3 Shots of liquor per week        BMP:   Lab Results   Component Value Date/Time     09/13/2024 10:14 AM    K 3.8 09/13/2024 10:14 AM     09/13/2024 10:14 AM    CO2 29 09/13/2024 10:14 AM    BUN 14 09/13/2024 10:14 AM    CREATININE 1.12

## 2025-03-28 ENCOUNTER — OFFICE VISIT (OUTPATIENT)
Age: 70
End: 2025-03-28
Payer: MEDICARE

## 2025-03-28 VITALS
TEMPERATURE: 97.2 F | OXYGEN SATURATION: 98 % | BODY MASS INDEX: 35.04 KG/M2 | WEIGHT: 231.2 LBS | RESPIRATION RATE: 16 BRPM | SYSTOLIC BLOOD PRESSURE: 136 MMHG | DIASTOLIC BLOOD PRESSURE: 80 MMHG | HEIGHT: 68 IN | HEART RATE: 91 BPM

## 2025-03-28 DIAGNOSIS — E78.5 HYPERLIPIDEMIA, UNSPECIFIED HYPERLIPIDEMIA TYPE: ICD-10-CM

## 2025-03-28 DIAGNOSIS — Z12.11 COLON CANCER SCREENING: ICD-10-CM

## 2025-03-28 DIAGNOSIS — Z00.00 MEDICARE ANNUAL WELLNESS VISIT, SUBSEQUENT: ICD-10-CM

## 2025-03-28 DIAGNOSIS — I10 HYPERTENSION, UNSPECIFIED TYPE: ICD-10-CM

## 2025-03-28 DIAGNOSIS — R73.03 PREDIABETES: ICD-10-CM

## 2025-03-28 DIAGNOSIS — Z12.5 PROSTATE CANCER SCREENING: Primary | ICD-10-CM

## 2025-03-28 DIAGNOSIS — Z12.5 PROSTATE CANCER SCREENING: ICD-10-CM

## 2025-03-28 DIAGNOSIS — N18.2 CKD (CHRONIC KIDNEY DISEASE) STAGE 2, GFR 60-89 ML/MIN: ICD-10-CM

## 2025-03-28 LAB
ALT SERPL-CCNC: 28 U/L (ref 12–78)
ANION GAP SERPL CALC-SCNC: 4 MMOL/L (ref 2–12)
AST SERPL-CCNC: 24 U/L (ref 15–37)
BUN SERPL-MCNC: 13 MG/DL (ref 6–20)
BUN/CREAT SERPL: 12 (ref 12–20)
CALCIUM SERPL-MCNC: 9.4 MG/DL (ref 8.5–10.1)
CHLORIDE SERPL-SCNC: 108 MMOL/L (ref 97–108)
CHOLEST SERPL-MCNC: 170 MG/DL
CO2 SERPL-SCNC: 26 MMOL/L (ref 21–32)
CREAT SERPL-MCNC: 1.13 MG/DL (ref 0.7–1.3)
ERYTHROCYTE [DISTWIDTH] IN BLOOD BY AUTOMATED COUNT: 15.9 % (ref 11.5–14.5)
EST. AVERAGE GLUCOSE BLD GHB EST-MCNC: 131 MG/DL
GLUCOSE SERPL-MCNC: 101 MG/DL (ref 65–100)
HBA1C MFR BLD: 6.2 % (ref 4–5.6)
HCT VFR BLD AUTO: 42.9 % (ref 36.6–50.3)
HDLC SERPL-MCNC: 56 MG/DL
HDLC SERPL: 3 (ref 0–5)
HGB BLD-MCNC: 14.4 G/DL (ref 12.1–17)
LDLC SERPL CALC-MCNC: 83.6 MG/DL (ref 0–100)
MCH RBC QN AUTO: 27 PG (ref 26–34)
MCHC RBC AUTO-ENTMCNC: 33.6 G/DL (ref 30–36.5)
MCV RBC AUTO: 80.5 FL (ref 80–99)
NRBC # BLD: 0 K/UL (ref 0–0.01)
NRBC BLD-RTO: 0 PER 100 WBC
PLATELET # BLD AUTO: 238 K/UL (ref 150–400)
PMV BLD AUTO: 11.3 FL (ref 8.9–12.9)
POTASSIUM SERPL-SCNC: 3.9 MMOL/L (ref 3.5–5.1)
PSA SERPL-MCNC: 3.1 NG/ML (ref 0.01–4)
RBC # BLD AUTO: 5.33 M/UL (ref 4.1–5.7)
SODIUM SERPL-SCNC: 138 MMOL/L (ref 136–145)
TRIGL SERPL-MCNC: 152 MG/DL
TSH SERPL DL<=0.05 MIU/L-ACNC: 2.51 UIU/ML (ref 0.36–3.74)
VLDLC SERPL CALC-MCNC: 30.4 MG/DL
WBC # BLD AUTO: 11.7 K/UL (ref 4.1–11.1)

## 2025-03-28 PROCEDURE — 99214 OFFICE O/P EST MOD 30 MIN: CPT | Performed by: INTERNAL MEDICINE

## 2025-03-28 SDOH — ECONOMIC STABILITY: FOOD INSECURITY: WITHIN THE PAST 12 MONTHS, THE FOOD YOU BOUGHT JUST DIDN'T LAST AND YOU DIDN'T HAVE MONEY TO GET MORE.: NEVER TRUE

## 2025-03-28 SDOH — ECONOMIC STABILITY: FOOD INSECURITY: WITHIN THE PAST 12 MONTHS, YOU WORRIED THAT YOUR FOOD WOULD RUN OUT BEFORE YOU GOT MONEY TO BUY MORE.: NEVER TRUE

## 2025-03-29 ENCOUNTER — RESULTS FOLLOW-UP (OUTPATIENT)
Age: 70
End: 2025-03-29

## 2025-05-12 RX ORDER — PRAVASTATIN SODIUM 40 MG
40 TABLET ORAL NIGHTLY
Qty: 90 TABLET | Refills: 3 | Status: SHIPPED | OUTPATIENT
Start: 2025-05-12

## 2025-06-13 RX ORDER — AMLODIPINE BESYLATE 10 MG/1
TABLET ORAL
Qty: 30 TABLET | Refills: 5 | Status: SHIPPED | OUTPATIENT
Start: 2025-06-13

## 2025-08-14 ENCOUNTER — ANESTHESIA (OUTPATIENT)
Facility: HOSPITAL | Age: 70
End: 2025-08-14
Payer: MEDICARE

## 2025-08-14 ENCOUNTER — HOSPITAL ENCOUNTER (OUTPATIENT)
Facility: HOSPITAL | Age: 70
Setting detail: OUTPATIENT SURGERY
Discharge: HOME | End: 2025-08-14
Attending: INTERNAL MEDICINE | Admitting: INTERNAL MEDICINE
Payer: MEDICARE

## 2025-08-14 ENCOUNTER — ANESTHESIA EVENT (OUTPATIENT)
Facility: HOSPITAL | Age: 70
End: 2025-08-14
Payer: MEDICARE

## 2025-08-14 VITALS
DIASTOLIC BLOOD PRESSURE: 76 MMHG | RESPIRATION RATE: 13 BRPM | TEMPERATURE: 97.9 F | BODY MASS INDEX: 34.84 KG/M2 | OXYGEN SATURATION: 96 % | HEART RATE: 81 BPM | SYSTOLIC BLOOD PRESSURE: 135 MMHG | WEIGHT: 229.9 LBS | HEIGHT: 68 IN

## 2025-08-14 PROCEDURE — 3600007502: Performed by: INTERNAL MEDICINE

## 2025-08-14 PROCEDURE — 3600007512: Performed by: INTERNAL MEDICINE

## 2025-08-14 PROCEDURE — 3700000001 HC ADD 15 MINUTES (ANESTHESIA): Performed by: INTERNAL MEDICINE

## 2025-08-14 PROCEDURE — 2709999900 HC NON-CHARGEABLE SUPPLY: Performed by: INTERNAL MEDICINE

## 2025-08-14 PROCEDURE — 88305 TISSUE EXAM BY PATHOLOGIST: CPT

## 2025-08-14 PROCEDURE — 3700000000 HC ANESTHESIA ATTENDED CARE: Performed by: INTERNAL MEDICINE

## 2025-08-14 PROCEDURE — 2580000003 HC RX 258: Performed by: INTERNAL MEDICINE

## 2025-08-14 PROCEDURE — 7100000010 HC PHASE II RECOVERY - FIRST 15 MIN: Performed by: INTERNAL MEDICINE

## 2025-08-14 PROCEDURE — 6360000002 HC RX W HCPCS: Performed by: NURSE ANESTHETIST, CERTIFIED REGISTERED

## 2025-08-14 RX ORDER — SODIUM CHLORIDE 0.9 % (FLUSH) 0.9 %
5-40 SYRINGE (ML) INJECTION PRN
Status: DISCONTINUED | OUTPATIENT
Start: 2025-08-14 | End: 2025-08-14 | Stop reason: HOSPADM

## 2025-08-14 RX ORDER — SODIUM CHLORIDE 0.9 % (FLUSH) 0.9 %
5-40 SYRINGE (ML) INJECTION EVERY 12 HOURS SCHEDULED
Status: DISCONTINUED | OUTPATIENT
Start: 2025-08-14 | End: 2025-08-14 | Stop reason: HOSPADM

## 2025-08-14 RX ORDER — SODIUM CHLORIDE 9 MG/ML
INJECTION, SOLUTION INTRAVENOUS PRN
Status: DISCONTINUED | OUTPATIENT
Start: 2025-08-14 | End: 2025-08-14 | Stop reason: HOSPADM

## 2025-08-14 RX ADMIN — SODIUM CHLORIDE: 9 INJECTION, SOLUTION INTRAVENOUS at 09:44

## 2025-08-14 RX ADMIN — PROPOFOL 180 MG: 10 INJECTION, EMULSION INTRAVENOUS at 09:56

## 2025-08-14 ASSESSMENT — PAIN - FUNCTIONAL ASSESSMENT: PAIN_FUNCTIONAL_ASSESSMENT: 0-10

## (undated) DEVICE — Z DISCONTINUED PER MEDLINE LINE GAS SAMPLING O2/CO2 LNG AD 13 FT NSL W/ TBNG FILTERLINE

## (undated) DEVICE — SNARE ENDOSCP L240CM LOOP W27MM SHTH DIA2.4MM WRK CHN 2.8MM

## (undated) DEVICE — SOLIDIFIER FLD 2OZ 1500CC N DISINF IN BTL DISP SAFESORB

## (undated) DEVICE — Device

## (undated) DEVICE — SYR 10ML LUER LOK 1/5ML GRAD --

## (undated) DEVICE — HYPODERMIC SAFETY NEEDLE: Brand: MAGELLAN

## (undated) DEVICE — ENDOSCOPIC KIT COMPLIANCE ENDOKIT

## (undated) DEVICE — TRAP ENDOSCP POLYP 2 CHMBR DRAWER TYP

## (undated) DEVICE — CUFF BLD PRSS AD CLTH SGL TB W/ BAYNT CONN ROUNDED CORNER

## (undated) DEVICE — SNARE ENDOSCP M L240CM W27MM SHTH DIA2.4MM CHN 2.8MM OVL

## (undated) DEVICE — TIP SUCT TRNSPAR RIB SURF STD BLB RIG NVENT W/ 5IN1 CONN DYND50138] MEDLINE INDUSTRIES INC]

## (undated) DEVICE — ELECTRODE,RADIOTRANSLUCENT,FOAM,5PK: Brand: MEDLINE

## (undated) DEVICE — NON-REM POLYHESIVE PATIENT RETURN ELECTRODE: Brand: VALLEYLAB

## (undated) DEVICE — TRAP,MUCUS SPECIMEN, 80CC: Brand: MEDLINE

## (undated) DEVICE — SET ADMIN 16ML TBNG L100IN 2 Y INJ SITE IV PIGGY BK DISP

## (undated) DEVICE — TOWEL 4 PLY TISS 19X30 SUE WHT

## (undated) DEVICE — NEONATAL-ADULT SPO2 SENSOR: Brand: NELLCOR

## (undated) DEVICE — CONTAINER SPEC 20 ML LID NEUT BUFF FORMALIN 10 % POLYPR STS

## (undated) DEVICE — SET GRAV CK VLV NEEDLESS ST 3 GANGED 4WAY STPCOCK HI FLO 10

## (undated) DEVICE — IV START KIT WITH SECUREMENT DEVICES

## (undated) DEVICE — STRAINER URIN CALC RNL MSH -- CONVERT TO ITEM 357634

## (undated) DEVICE — SYR 3ML LL TIP 1/10ML GRAD --

## (undated) DEVICE — BASIN EMSIS 16OZ GRAPHITE PLAS KID SHP MOLD GRAD FOR ORAL

## (undated) DEVICE — 1200 GUARD II KIT W/5MM TUBE W/O VAC TUBE: Brand: GUARDIAN

## (undated) DEVICE — CATH IV AUTOGRD BC PNK 20GA 25 -- INSYTE